# Patient Record
Sex: MALE | Race: WHITE | NOT HISPANIC OR LATINO | Employment: FULL TIME | URBAN - METROPOLITAN AREA
[De-identification: names, ages, dates, MRNs, and addresses within clinical notes are randomized per-mention and may not be internally consistent; named-entity substitution may affect disease eponyms.]

---

## 2018-04-16 ENCOUNTER — HISTORICAL (OUTPATIENT)
Dept: RADIOLOGY | Facility: HOSPITAL | Age: 63
End: 2018-04-16

## 2018-09-04 ENCOUNTER — HISTORICAL (OUTPATIENT)
Dept: PREADMISSION TESTING | Facility: HOSPITAL | Age: 63
End: 2018-09-04

## 2018-09-04 LAB
ABS NEUT (OLG): 2.62 X10(3)/MCL (ref 2.1–9.2)
ALBUMIN SERPL-MCNC: 3.9 GM/DL (ref 3.4–5)
ALBUMIN/GLOB SERPL: 1.3 RATIO (ref 1.1–2)
ALP SERPL-CCNC: 84 UNIT/L (ref 50–136)
ALT SERPL-CCNC: 47 UNIT/L (ref 12–78)
APTT PPP: 27.8 SECOND(S) (ref 24.8–36.9)
AST SERPL-CCNC: 23 UNIT/L (ref 15–37)
BASOPHILS # BLD AUTO: 0 X10(3)/MCL (ref 0–0.2)
BASOPHILS NFR BLD AUTO: 1 %
BILIRUB SERPL-MCNC: 0.6 MG/DL (ref 0.2–1)
BILIRUBIN DIRECT+TOT PNL SERPL-MCNC: 0.2 MG/DL (ref 0–0.5)
BILIRUBIN DIRECT+TOT PNL SERPL-MCNC: 0.4 MG/DL (ref 0–0.8)
BUN SERPL-MCNC: 16 MG/DL (ref 7–18)
CALCIUM SERPL-MCNC: 9.3 MG/DL (ref 8.5–10.1)
CHLORIDE SERPL-SCNC: 106 MMOL/L (ref 98–107)
CO2 SERPL-SCNC: 28 MMOL/L (ref 21–32)
CREAT SERPL-MCNC: 1.23 MG/DL (ref 0.7–1.3)
EOSINOPHIL # BLD AUTO: 0.2 X10(3)/MCL (ref 0–0.9)
EOSINOPHIL NFR BLD AUTO: 3 %
ERYTHROCYTE [DISTWIDTH] IN BLOOD BY AUTOMATED COUNT: 12.9 % (ref 11.5–17)
GLOBULIN SER-MCNC: 3.1 GM/DL (ref 2.4–3.5)
GLUCOSE SERPL-MCNC: 95 MG/DL (ref 74–106)
HCT VFR BLD AUTO: 46.4 % (ref 42–52)
HGB BLD-MCNC: 15.7 GM/DL (ref 14–18)
INR PPP: 0.92 (ref 0–1.27)
LYMPHOCYTES # BLD AUTO: 1.6 X10(3)/MCL (ref 0.6–4.6)
LYMPHOCYTES NFR BLD AUTO: 32 %
MCH RBC QN AUTO: 30.3 PG (ref 27–31)
MCHC RBC AUTO-ENTMCNC: 33.8 GM/DL (ref 33–36)
MCV RBC AUTO: 89.6 FL (ref 80–94)
MONOCYTES # BLD AUTO: 0.5 X10(3)/MCL (ref 0.1–1.3)
MONOCYTES NFR BLD AUTO: 11 %
NEUTROPHILS # BLD AUTO: 2.62 X10(3)/MCL (ref 1.4–7.9)
NEUTROPHILS NFR BLD AUTO: 53 %
PLATELET # BLD AUTO: 217 X10(3)/MCL (ref 130–400)
PMV BLD AUTO: 9.7 FL (ref 9.4–12.4)
POTASSIUM SERPL-SCNC: 4.7 MMOL/L (ref 3.5–5.1)
PROT SERPL-MCNC: 7 GM/DL (ref 6.4–8.2)
PROTHROMBIN TIME: 12.6 SECOND(S) (ref 12.2–14.7)
RBC # BLD AUTO: 5.18 X10(6)/MCL (ref 4.7–6.1)
SODIUM SERPL-SCNC: 141 MMOL/L (ref 136–145)
WBC # SPEC AUTO: 5 X10(3)/MCL (ref 4.5–11.5)

## 2018-09-10 ENCOUNTER — HISTORICAL (OUTPATIENT)
Dept: SURGERY | Facility: HOSPITAL | Age: 63
End: 2018-09-10

## 2018-09-11 LAB — CALCIUM SERPL-MCNC: 8.6 MG/DL (ref 8.5–10.1)

## 2018-10-08 ENCOUNTER — HISTORICAL (OUTPATIENT)
Dept: SURGERY | Facility: HOSPITAL | Age: 63
End: 2018-10-08

## 2018-10-09 LAB — CALCIUM SERPL-MCNC: 8.7 MG/DL (ref 8.5–10.1)

## 2019-01-28 ENCOUNTER — HISTORICAL (OUTPATIENT)
Dept: LAB | Facility: HOSPITAL | Age: 64
End: 2019-01-28

## 2019-01-28 LAB
ABS NEUT (OLG): 2.67 X10(3)/MCL (ref 2.1–9.2)
ALBUMIN SERPL-MCNC: 4.1 GM/DL (ref 3.4–5)
ALBUMIN/GLOB SERPL: 1.3 {RATIO}
ALP SERPL-CCNC: 123 UNIT/L (ref 50–136)
ALT SERPL-CCNC: 100 UNIT/L (ref 12–78)
AST SERPL-CCNC: 58 UNIT/L (ref 15–37)
BASOPHILS # BLD AUTO: 0 X10(3)/MCL (ref 0–0.2)
BASOPHILS NFR BLD AUTO: 1 %
BILIRUB SERPL-MCNC: 0.6 MG/DL (ref 0.2–1)
BILIRUBIN DIRECT+TOT PNL SERPL-MCNC: 0.2 MG/DL (ref 0–0.2)
BILIRUBIN DIRECT+TOT PNL SERPL-MCNC: 0.4 MG/DL (ref 0–0.8)
BUN SERPL-MCNC: 18 MG/DL (ref 7–18)
CALCIUM SERPL-MCNC: 9.6 MG/DL (ref 8.5–10.1)
CHLORIDE SERPL-SCNC: 107 MMOL/L (ref 98–107)
CHOLEST SERPL-MCNC: 224 MG/DL (ref 0–200)
CHOLEST/HDLC SERPL: 4.5 {RATIO} (ref 0–5)
CO2 SERPL-SCNC: 29 MMOL/L (ref 21–32)
CREAT SERPL-MCNC: 1.23 MG/DL (ref 0.7–1.3)
EOSINOPHIL # BLD AUTO: 0.1 X10(3)/MCL (ref 0–0.9)
EOSINOPHIL NFR BLD AUTO: 3 %
ERYTHROCYTE [DISTWIDTH] IN BLOOD BY AUTOMATED COUNT: 13 % (ref 11.5–17)
EST. AVERAGE GLUCOSE BLD GHB EST-MCNC: 105 MG/DL
GLOBULIN SER-MCNC: 3.1 GM/DL (ref 2.4–3.5)
GLUCOSE SERPL-MCNC: 101 MG/DL (ref 74–106)
HBA1C MFR BLD: 5.3 % (ref 4.2–6.3)
HCT VFR BLD AUTO: 47 % (ref 42–52)
HDLC SERPL-MCNC: 50 MG/DL (ref 35–60)
HGB BLD-MCNC: 16 GM/DL (ref 14–18)
LDLC SERPL CALC-MCNC: 150 MG/DL (ref 0–129)
LYMPHOCYTES # BLD AUTO: 1.5 X10(3)/MCL (ref 0.6–4.6)
LYMPHOCYTES NFR BLD AUTO: 31 %
MCH RBC QN AUTO: 29.9 PG (ref 27–31)
MCHC RBC AUTO-ENTMCNC: 34 GM/DL (ref 33–36)
MCV RBC AUTO: 87.9 FL (ref 80–94)
MONOCYTES # BLD AUTO: 0.5 X10(3)/MCL (ref 0.1–1.3)
MONOCYTES NFR BLD AUTO: 11 %
NEUTROPHILS # BLD AUTO: 2.67 X10(3)/MCL (ref 2.1–9.2)
NEUTROPHILS NFR BLD AUTO: 55 %
PLATELET # BLD AUTO: 245 X10(3)/MCL (ref 130–400)
PMV BLD AUTO: 9.7 FL (ref 9.4–12.4)
POTASSIUM SERPL-SCNC: 4.7 MMOL/L (ref 3.5–5.1)
PROT SERPL-MCNC: 7.2 GM/DL (ref 6.4–8.2)
PSA SERPL-MCNC: 0.93 NG/ML (ref 0–4)
RBC # BLD AUTO: 5.35 X10(6)/MCL (ref 4.7–6.1)
SODIUM SERPL-SCNC: 142 MMOL/L (ref 136–145)
TRIGL SERPL-MCNC: 121 MG/DL (ref 30–150)
VLDLC SERPL CALC-MCNC: 24 MG/DL
WBC # SPEC AUTO: 4.9 X10(3)/MCL (ref 4.5–11.5)

## 2019-02-27 ENCOUNTER — HISTORICAL (OUTPATIENT)
Dept: RADIOLOGY | Facility: HOSPITAL | Age: 64
End: 2019-02-27

## 2019-02-27 ENCOUNTER — HISTORICAL (OUTPATIENT)
Dept: ADMINISTRATIVE | Facility: HOSPITAL | Age: 64
End: 2019-02-27

## 2019-02-27 LAB
ALBUMIN SERPL-MCNC: 3.7 GM/DL (ref 3.4–5)
ALBUMIN/GLOB SERPL: 1.4 {RATIO}
ALP SERPL-CCNC: 98 UNIT/L (ref 50–136)
ALT SERPL-CCNC: 71 UNIT/L (ref 12–78)
AST SERPL-CCNC: 33 UNIT/L (ref 15–37)
BILIRUB SERPL-MCNC: 0.6 MG/DL (ref 0.2–1)
BILIRUBIN DIRECT+TOT PNL SERPL-MCNC: 0.1 MG/DL (ref 0–0.2)
BILIRUBIN DIRECT+TOT PNL SERPL-MCNC: 0.5 MG/DL (ref 0–0.8)
BUN SERPL-MCNC: 15 MG/DL (ref 7–18)
CALCIUM SERPL-MCNC: 9 MG/DL (ref 8.5–10.1)
CHLORIDE SERPL-SCNC: 107 MMOL/L (ref 98–107)
CO2 SERPL-SCNC: 29 MMOL/L (ref 21–32)
CREAT SERPL-MCNC: 1.17 MG/DL (ref 0.7–1.3)
GLOBULIN SER-MCNC: 2.7 GM/DL (ref 2.4–3.5)
GLUCOSE SERPL-MCNC: 101 MG/DL (ref 74–106)
POTASSIUM SERPL-SCNC: 4.7 MMOL/L (ref 3.5–5.1)
PROT SERPL-MCNC: 6.4 GM/DL (ref 6.4–8.2)
SODIUM SERPL-SCNC: 142 MMOL/L (ref 136–145)

## 2020-03-09 LAB — CRC RECOMMENDATION EXT: NORMAL

## 2021-02-05 ENCOUNTER — HISTORICAL (OUTPATIENT)
Dept: ADMINISTRATIVE | Facility: HOSPITAL | Age: 66
End: 2021-02-05

## 2021-02-05 LAB
ABS NEUT (OLG): 2.78 X10(3)/MCL (ref 2.1–9.2)
ALBUMIN SERPL-MCNC: 4 GM/DL (ref 3.4–4.8)
ALBUMIN/GLOB SERPL: 1.9 RATIO (ref 1.1–2)
ALP SERPL-CCNC: 82 UNIT/L (ref 40–150)
ALT SERPL-CCNC: 30 UNIT/L (ref 0–55)
APPEARANCE, UA: CLEAR
AST SERPL-CCNC: 22 UNIT/L (ref 5–34)
BACTERIA SPEC CULT: ABNORMAL /HPF
BASOPHILS # BLD AUTO: 0 X10(3)/MCL (ref 0–0.2)
BASOPHILS NFR BLD AUTO: 1 %
BILIRUB SERPL-MCNC: 0.7 MG/DL
BILIRUB UR QL STRIP: NEGATIVE
BILIRUBIN DIRECT+TOT PNL SERPL-MCNC: 0.3 MG/DL (ref 0–0.5)
BILIRUBIN DIRECT+TOT PNL SERPL-MCNC: 0.4 MG/DL (ref 0–0.8)
BUN SERPL-MCNC: 15.2 MG/DL (ref 8.4–25.7)
CALCIUM SERPL-MCNC: 8.8 MG/DL (ref 8.8–10)
CHLORIDE SERPL-SCNC: 106 MMOL/L (ref 98–107)
CHOLEST SERPL-MCNC: 152 MG/DL
CHOLEST/HDLC SERPL: 4 {RATIO} (ref 0–5)
CO2 SERPL-SCNC: 26 MMOL/L (ref 23–31)
COLOR UR: YELLOW
CREAT SERPL-MCNC: 1.08 MG/DL (ref 0.73–1.18)
EOSINOPHIL # BLD AUTO: 0.1 X10(3)/MCL (ref 0–0.9)
EOSINOPHIL NFR BLD AUTO: 2 %
ERYTHROCYTE [DISTWIDTH] IN BLOOD BY AUTOMATED COUNT: 14 % (ref 11.5–17)
EST. AVERAGE GLUCOSE BLD GHB EST-MCNC: 105.4 MG/DL
GLOBULIN SER-MCNC: 2.1 GM/DL (ref 2.4–3.5)
GLUCOSE (UA): NEGATIVE
GLUCOSE SERPL-MCNC: 95 MG/DL (ref 82–115)
HBA1C MFR BLD: 5.3 %
HCT VFR BLD AUTO: 41.3 % (ref 42–52)
HDLC SERPL-MCNC: 41 MG/DL (ref 35–60)
HGB BLD-MCNC: 14.1 GM/DL (ref 14–18)
HGB UR QL STRIP: NEGATIVE
KETONES UR QL STRIP: ABNORMAL
LDLC SERPL CALC-MCNC: 83 MG/DL (ref 50–140)
LEUKOCYTE ESTERASE UR QL STRIP: ABNORMAL
LYMPHOCYTES # BLD AUTO: 1.4 X10(3)/MCL (ref 0.6–4.6)
LYMPHOCYTES NFR BLD AUTO: 29 %
MCH RBC QN AUTO: 30.3 PG (ref 27–31)
MCHC RBC AUTO-ENTMCNC: 34.1 GM/DL (ref 33–36)
MCV RBC AUTO: 88.8 FL (ref 80–94)
MONOCYTES # BLD AUTO: 0.6 X10(3)/MCL (ref 0.1–1.3)
MONOCYTES NFR BLD AUTO: 12 %
NEUTROPHILS # BLD AUTO: 2.78 X10(3)/MCL (ref 2.1–9.2)
NEUTROPHILS NFR BLD AUTO: 56 %
NITRITE UR QL STRIP: NEGATIVE
PH UR STRIP: 6 [PH] (ref 5–9)
PLATELET # BLD AUTO: 207 X10(3)/MCL (ref 130–400)
PMV BLD AUTO: 9.7 FL (ref 9.4–12.4)
POTASSIUM SERPL-SCNC: 4.4 MMOL/L (ref 3.5–5.1)
PROT SERPL-MCNC: 6.1 GM/DL (ref 5.8–7.6)
PROT UR QL STRIP: NEGATIVE
PSA SERPL-MCNC: 0.87 NG/ML
RBC # BLD AUTO: 4.65 X10(6)/MCL (ref 4.7–6.1)
RBC #/AREA URNS HPF: ABNORMAL /[HPF]
SODIUM SERPL-SCNC: 141 MMOL/L (ref 136–145)
SP GR UR STRIP: 1.02 (ref 1–1.03)
SQUAMOUS EPITHELIAL, UA: ABNORMAL
TRIGL SERPL-MCNC: 139 MG/DL (ref 34–140)
TSH SERPL-ACNC: 0.37 UIU/ML (ref 0.35–4.94)
UROBILINOGEN UR STRIP-ACNC: 1
VLDLC SERPL CALC-MCNC: 28 MG/DL
WBC # SPEC AUTO: 5 X10(3)/MCL (ref 4.5–11.5)
WBC #/AREA URNS HPF: 6 /HPF (ref 0–3)

## 2022-02-11 ENCOUNTER — HISTORICAL (OUTPATIENT)
Dept: ADMINISTRATIVE | Facility: HOSPITAL | Age: 67
End: 2022-02-11

## 2022-02-11 LAB
ABS NEUT (OLG): 2.78 (ref 2.1–9.2)
ALBUMIN SERPL-MCNC: 4 G/DL (ref 3.4–4.8)
ALBUMIN/GLOB SERPL: 1.2 {RATIO} (ref 1.1–2)
ALP SERPL-CCNC: 113 U/L (ref 40–150)
ALT SERPL-CCNC: 59 U/L (ref 0–55)
APPEARANCE, UA: CLEAR
AST SERPL-CCNC: 23 U/L (ref 5–34)
BACTERIA SPEC CULT: NORMAL
BASOPHILS # BLD AUTO: 0 10*3/UL (ref 0–0.2)
BASOPHILS NFR BLD AUTO: 1 %
BILIRUB SERPL-MCNC: 0.6 MG/DL
BILIRUB UR QL STRIP: NEGATIVE
BILIRUBIN DIRECT+TOT PNL SERPL-MCNC: 0.2 (ref 0–0.5)
BILIRUBIN DIRECT+TOT PNL SERPL-MCNC: 0.4 (ref 0–0.8)
BUDDING YEAST: NORMAL
BUN SERPL-MCNC: 15.7 MG/DL (ref 8.4–25.7)
CALCIUM SERPL-MCNC: 10.2 MG/DL (ref 8.7–10.5)
CASTS, UA: NORMAL
CHLORIDE SERPL-SCNC: 106 MMOL/L (ref 98–107)
CHOLEST SERPL-MCNC: 220 MG/DL
CHOLEST/HDLC SERPL: 5 {RATIO} (ref 0–5)
CO2 SERPL-SCNC: 29 MMOL/L (ref 23–31)
COLOR UR: YELLOW
CREAT SERPL-MCNC: 1.17 MG/DL (ref 0.73–1.18)
CRYSTALS: NORMAL
EOSINOPHIL # BLD AUTO: 0.1 10*3/UL (ref 0–0.9)
EOSINOPHIL NFR BLD AUTO: 3 %
ERYTHROCYTE [DISTWIDTH] IN BLOOD BY AUTOMATED COUNT: 12.8 % (ref 11.5–17)
GLOBULIN SER-MCNC: 3.2 G/DL (ref 2.4–3.5)
GLUCOSE (UA): NEGATIVE
GLUCOSE SERPL-MCNC: 100 MG/DL (ref 82–115)
HCT VFR BLD AUTO: 45.4 % (ref 42–52)
HDLC SERPL-MCNC: 45 MG/DL (ref 35–60)
HEMOLYSIS INTERF INDEX SERPL-ACNC: 0
HGB BLD-MCNC: 15.2 G/DL (ref 14–18)
HGB UR QL STRIP: NEGATIVE
ICTERIC INTERF INDEX SERPL-ACNC: 1
KETONES UR QL STRIP: NEGATIVE
LDLC SERPL CALC-MCNC: 143 MG/DL (ref 50–140)
LEUKOCYTE ESTERASE UR QL STRIP: NEGATIVE
LIPEMIC INTERF INDEX SERPL-ACNC: 8
LYMPHOCYTES # BLD AUTO: 1.5 10*3/UL (ref 0.6–4.6)
LYMPHOCYTES NFR BLD AUTO: 30 %
MANUAL DIFF? (OHS): NO
MCH RBC QN AUTO: 29.8 PG (ref 27–31)
MCHC RBC AUTO-ENTMCNC: 33.5 G/DL (ref 33–36)
MCV RBC AUTO: 89 FL (ref 80–94)
MONOCYTES # BLD AUTO: 0.5 10*3/UL (ref 0.1–1.3)
MONOCYTES NFR BLD AUTO: 10 %
NEUTROPHILS # BLD AUTO: 2.78 10*3/UL (ref 2.1–9.2)
NEUTROPHILS NFR BLD AUTO: 56 %
NITRITE UR QL STRIP: NEGATIVE
PH UR STRIP: 6 [PH] (ref 5–9)
PLATELET # BLD AUTO: 232 10*3/UL (ref 130–400)
PMV BLD AUTO: 9.9 FL (ref 9.4–12.4)
POTASSIUM SERPL-SCNC: 4.2 MMOL/L (ref 3.5–5.1)
PROT SERPL-MCNC: 7.2 G/DL (ref 5.8–7.6)
PROT UR QL STRIP: NEGATIVE
PSA SERPL-MCNC: 1.09 NG/ML
RBC # BLD AUTO: 5.1 10*6/UL (ref 4.7–6.1)
RBC #/AREA URNS HPF: NORMAL /[HPF] (ref 0–2)
SMALL ROUND CELLS, UA: NORMAL
SODIUM SERPL-SCNC: 144 MMOL/L (ref 136–145)
SP GR UR STRIP: 1 (ref 1–1.03)
SPERM URNS QL MICRO: NORMAL
SQUAMOUS EPITHELIAL, UA: NORMAL (ref 0–4)
T4 FREE SERPL-MCNC: 1.1 NG/DL (ref 0.7–1.48)
TRIGL SERPL-MCNC: 162 MG/DL (ref 34–140)
TSH SERPL-ACNC: 0.16 M[IU]/L (ref 0.35–4.94)
UROBILINOGEN UR STRIP-ACNC: 0.2
VLDLC SERPL CALC-MCNC: 32 MG/DL
WBC # SPEC AUTO: 4.9 10*3/UL (ref 4.5–11.5)
WBC #/AREA URNS HPF: NORMAL /[HPF] (ref 0–2)

## 2022-04-10 ENCOUNTER — HISTORICAL (OUTPATIENT)
Dept: ADMINISTRATIVE | Facility: HOSPITAL | Age: 67
End: 2022-04-10
Payer: COMMERCIAL

## 2022-04-26 VITALS
HEIGHT: 69 IN | OXYGEN SATURATION: 99 % | WEIGHT: 205 LBS | BODY MASS INDEX: 30.36 KG/M2 | SYSTOLIC BLOOD PRESSURE: 128 MMHG | DIASTOLIC BLOOD PRESSURE: 74 MMHG

## 2022-04-30 NOTE — OP NOTE
DATE OF SURGERY:        SURGEON:  Richie Santana MD    PREOPERATIVE DIAGNOSIS:  Follicular carcinoma.    POSTOPERATIVE DIAGNOSIS:  Follicular carcinoma.    PROCEDURE:  Left completion thyroidectomy.    ANESTHESIA:  General.    BLOOD LOSS:  Less than 10 cc.    SPECIMENS:  Left thyroid lobe.    CONDITION:  Stable.    HISTORY OF PRESENT ILLNESS:  This is a 62-year-old gentleman who previously underwent right thyroid lobectomy and isthmusectomy by me.  Final pathology came back as a follicular carcinoma.  In light of this, he was offered aforementioned surgery.  All risks and benefits were explained to the patient and informed consent was obtained prior to proceeding.    REPORT OF OPERATION:  The patient was taken to the operating room and placed on the operating table in supine position and general endotracheal anesthesia was administered.  I went through my previous anterior neck incision after draping in sterile fashion.  I dissected down and identified the strap muscles.  I removed the 3-0 Vicryl suture that were placed there and dissected the strap muscle in the midline.  Identified the trachea and larynx.  Identified the left thyroid lobe, dissected the tissue off it.  Dissected out the superior pole.  Clamped and ligated the superior pole vessels with the Harmonic scalpel.  Then identified the superior parathyroid gland and dissected this off the gland.  I was careful to preserve its native vasculature.  I dissected down to the level of the cricothyroid joint and identified the recurrent laryngeal nerve.  This was dissected out superiorly and inferiorly until it was well skeletonized. I continued my dissection inferiorly.  Clamped and ligated the middle thyroid vein __________ the inferior pole vessels.  Stayed on the gland. Dissected down to the level of the trachea.  At this point, the gland__________ anterior tracheal wall and the Bovie was used to remove the remaining attachments.  The lobe was then  sent for permanent pathology.  It was copiously irrigated with saline irrigation.  I explored the level 6 on each side for signs of suspicious lymph nodes.  None were noted.  I completed hemostasis using bipolar.  A small piece of Surgicel packing of the joint.  The wound was then closed using 3-0 Vicryl in simple running fashion and then closed the skin using 3-0 Vicryl deep and running 5-0 Monocryl     subcuticular stitch on the skin.  The patient was awoken and brought to the recovery room without complications.        ______________________________  Richie Santana MD JD/BAKARI  DD:  10/30/2018  Time:  04:06PM  DT:  10/30/2018  Time:  05:48PM  Job #:  [id number]

## 2022-04-30 NOTE — OP NOTE
DATE OF SURGERY:        SURGEON:  Richie Santana MD    PREOPERATIVE DIAGNOSIS:  Right thyroid mass.    POSTOP DIAGNOSIS:  Right thyroid mass.    PROCEDURE:  Right thyroid lobectomy and isthmusectomy.    ASSISTANT:  None.    ANESTHESIA:  General endotracheal.    BLOOD LOSS:  10 cc.    SPECIMENS:  Right thyroid lobe and isthmus.    CONDITION:  stable.    COMPLICATIONS:  None.    HISTORY OF PRESENT ILLNESS:  This is a 62-year-old male referred to me by Dr. Julian Wright after a suspicious ultrasound-guided biopsy and suspicious Afirma testing.  In light of this, he was offered aforementioned surgery.  Risks and benefits were explained to the patient in detail.  Informed consent was obtained prior to proceeding.    REPORT OF OPERATION:  The patient was brought to the operative room, placed on the operating table in supine position where general endotracheal was administered.  I marked out my preplanned incision in the anterior neck crease, injected this with 3 cc 1% lidocaine with epinephrine.  Patient prepped and draped in a sterile fashion.  __________ blade was used to make an approximately 4 cm incision in the anterior neck.  I dissected down, identified the platysma.  I raised circumferential subplatysmal flaps with the Bovie.  I then placed the John retractor into position in standard fashion, identified the strap muscles, divided these in the midline raphae using the Bovie, and identified the thyroid gland.  I dissected out the superior pole, clamped and ligated the superior pole vessels using the harmonic scalpel.  I then dissected down the posterior aspect of the gland, identified the superior parathyroid gland, dissected this off, carefully delivered attached to its native blood supply.  I then dissected down to the level of the cricothyroid joint, identified the recurrent laryngeal nerve.  I stimulated to confirm that it was in fact the nerve (which it was) and dissected it inferiorly.  At this  point, I identified the middle thyroid vein, clamped and ligated this with the Harmonic scalpel __________ dissection inferiorly.  When I clamped and ligated the inferior pole vessels, I did not visualize the inferior parathyroid gland.  I stayed on the capsule of the gland to ensure that it stayed down.  At this point, I began the dissection of the nerve at the joint.  Note the cystic lesion within the gland was just on top of the nerve which made the dissection somewhat difficult, but I was able to dissect the Berry ligament to where the gland itself was pedicled on the anterior tracheal wall.  At this point, the Bovie was used to remove the remainder of the gland.  This was sent for frozen section.  While this was being examined, I performed a level VI examination.  Found no suspicious lymph nodes or lesions within the level VI.  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  The pathologist could not make a definitive call.  In light of this, the wound was closed after irrigating and achieving complete hemostasis.  __________ 3-0 Vicryl to reapproximate the strap muscles as well as a 3-0 Vicryl in buried deep fashion to close the platysmal layer.  The skin was closed using a 5-0 Monocryl in running subcuticular fashion followed by Steri-Strips and pressure dressing.  The patient was then awoken and brought to the recovery room without complications.        ______________________________  Richie Santana MD JD/  DD:  09/10/2018  Time:  10:29AM  DT:  09/10/2018  Time:  01:11PM  Job #:  [id number]

## 2022-11-08 ENCOUNTER — DOCUMENTATION ONLY (OUTPATIENT)
Dept: ADMINISTRATIVE | Facility: HOSPITAL | Age: 67
End: 2022-11-08
Payer: COMMERCIAL

## 2023-02-06 DIAGNOSIS — Z00.00 WELL ADULT EXAM: Primary | ICD-10-CM

## 2023-02-06 DIAGNOSIS — E78.5 HYPERLIPIDEMIA, UNSPECIFIED HYPERLIPIDEMIA TYPE: ICD-10-CM

## 2023-02-06 DIAGNOSIS — I10 HYPERTENSION, UNSPECIFIED TYPE: ICD-10-CM

## 2023-02-06 DIAGNOSIS — E03.9 HYPOTHYROIDISM, UNSPECIFIED TYPE: ICD-10-CM

## 2023-12-18 PROBLEM — I10 PRIMARY HYPERTENSION: Status: ACTIVE | Noted: 2023-12-18

## 2023-12-20 ENCOUNTER — OFFICE VISIT (OUTPATIENT)
Dept: SURGERY | Facility: CLINIC | Age: 68
End: 2023-12-20
Payer: COMMERCIAL

## 2023-12-20 VITALS
HEIGHT: 69 IN | HEART RATE: 76 BPM | WEIGHT: 211.38 LBS | SYSTOLIC BLOOD PRESSURE: 137 MMHG | BODY MASS INDEX: 31.31 KG/M2 | DIASTOLIC BLOOD PRESSURE: 80 MMHG

## 2023-12-20 DIAGNOSIS — K40.90 LEFT INGUINAL HERNIA: Primary | ICD-10-CM

## 2023-12-20 DIAGNOSIS — K40.90 LEFT INGUINAL HERNIA: ICD-10-CM

## 2023-12-20 PROCEDURE — 99204 OFFICE O/P NEW MOD 45 MIN: CPT | Mod: ,,, | Performed by: SURGERY

## 2023-12-20 RX ORDER — CEFAZOLIN SODIUM 2 G/50ML
2 SOLUTION INTRAVENOUS
Status: CANCELLED | OUTPATIENT
Start: 2023-12-20

## 2024-01-02 RX ORDER — UBIDECARENONE 30 MG
30 CAPSULE ORAL DAILY
COMMUNITY

## 2024-01-04 NOTE — DISCHARGE INSTRUCTIONS
Post Operative Inguinal Hernia Discharge Instructions      Laparoscopic Inguinal Hernia Repair, Adult, Care After  This sheet gives you information about how to care for yourself after your procedure. Your health care provider may also give you more specific instructions. If you have problems or questions, contact your health care provider.  What can I expect after the procedure?  After the procedure, it is common to have:   Pain.   Swelling and bruising around the incision area.   Scrotal swelling, in men.   Some fluid or blood draining from your incisions.  Follow these instructions at home:  Incision care   Follow instructions from your health care provider about how to take care of your incisions. Make sure you:  ? Wash your hands with soap and water before you change your bandage (dressing). If soap and water are not available, use hand .  ? Keep surgical gauze dressing on for 48 hours, then ok to remove gauze dressings and shower.   Ok to shower. Wash incisions with antibacterial soap and water for 2 weeks post op. Pat dry. Avoid tub baths/swimming for 2 weeks post op to ensure incisions have completely closed.   ? Leave stitches (sutures), skin glue, or adhesive strips in place. Do not remove adhesive strips or dermabond. It will come off within 1-2 weeks.    Check your incision area every day for signs of infection. Check for:  ? More redness, swelling, or pain.  ? More fluid or blood.  ? Warmth.  ? Pus or a bad smell.   Wear loose, soft clothing while your incisions heal.   Wear scrotal support (jock strap) provided to you by the hospital staff to prevent post op swelling. If scrotal support not available, please wear tight fitted briefs to limit scrotal swelling.   Scrotal support helps to limit swelling in the groin and scrotum in the initial post op period. Wear scrotal support for first 2 weeks post op if possible.   Driving   Do not drive or use heavy machinery while taking prescription pain  medicine.   Do not drive for 3 days after surgery or as long as you are taking prescription pain medicine.    Activity   Do not lift anything that is heavier than 20 lb (4.5 kg) for 4 weeks post op.    Ask your health care provider what activities are safe for you. A lot of activity during the first week after surgery can increase pain and swelling. For 1 week after your procedure:  ? Avoid activities that take a lot of effort, such as exercise or sports.  ? You may walk and climb stairs as needed for daily activity, but avoid long walks or climbing stairs for exercise.  Managing pain and swelling     Put ice on painful or swollen areas:  ? Put ice in a plastic bag.  ? Place a towel between your skin and the bag.  ? Leave the ice on for 20 minutes, 2-3 times a day.  General instructions   Take over-the-counter and prescription medicines only as told by your health care provider.   To prevent or treat constipation while you are taking prescription pain medicine, your health care provider may recommend that you:   ? Drink enough fluid to keep your urine pale yellow.  ? Take over-the-counter or prescription medicines.   ? Eat foods that are high in fiber, such as fresh fruits and vegetables, whole grains, and beans.   ? Limit foods that are high in fat and processed sugars, such as fried and sweet foods.   Do not use any products that contain nicotine or tobacco, such as cigarettes and e-cigarettes. If you need help quitting, ask your health care provider.   Drink enough fluid to keep your urine pale yellow.   Keep all follow-up visits as told by your health care provider. This is important.  Contact a health care provider if:   You have more redness, swelling, or pain around your incisions or your groin area.   You have more swelling in your scrotum.   You have more fluid or blood coming from your incisions.   Your incisions feel warm to the touch.   You have severe pain and medicines do not help.   You have abdominal  pain or swelling.   You cannot eat or drink without vomiting.   You cannot urinate or pass a bowel movement.   You faint.   You feel dizzy.   You have nausea and vomiting.   You have a fever.  Get help right away if:   You have pus or a bad smell coming from your incisions.   You have redness, warmth, or pain in your leg.   You have chest pain.   You have problems breathing.  Summary   Pain, swelling, and bruising are common after the procedure.   Check your incision area every day for signs of infection, such as more redness, swelling, or pain.   Put ice on painful or swollen areas for 20 minutes, 2-3 times a day.  This information is not intended to replace advice given to you by your health care provider. Make sure you discuss any questions you have with your health care provider.    How to Prevent Constipation After Surgery  Constipation is a common problem after surgery. Many things can make constipation more likely after a surgery, including:   Certain medicines, especially numbing medicines (anesthetics) and very strong pain medicines called opioids.   Feeling stressed because of the surgery.   Eating different foods than normal.   Being less active.  Symptoms of constipation include:   Having fewer than three bowel movements a week.   Straining to have a bowel movement.   Having hard, dry, or larger-than-normal stools (feces).   Discomfort in the lower abdomen, such as cramps or bloating.   Not feeling relief after having a bowel movement.   Nausea and vomiting.  You can take steps to help prevent constipation after surgery.  Follow these instructions at home:  Eating and drinking     Eat foods that have a lot of fiber in them, such as beans, bran, whole grains, and fresh fruits and vegetables.   Limit foods that are high in fat and processed sugars, such as fried or sweet foods. These include french fries, hamburgers, cookies, and candy.   Take a fiber supplement as told by your health care provider. If you  are not taking a fiber supplement and you think you are not getting enough fiber from foods, talk to your health care provider about adding a fiber supplement to your diet.   Drink enough fluid to keep your urine pale yellow.   Drink clear fluids, especially water. Avoid drinking alcohol, caffeine, and soda. These can make constipation worse.  Activity     After surgery, return to your normal activities slowly, or when your health care provider says it is okay.   Start walking as soon as you can. Try to go a little farther each day.   Once your health care provider approves, do some sort of regular exercise. This helps prevent constipation.  Bowel movements   Go to the restroom when you have the urge to go. Do not hold it in.   Try drinking something hot to get a bowel movement started.   Keep track of how often you use the restroom.  Medicines   Take over-the-counter and prescription medicines only as told by your health care provider.   Talk to your health care provider about medicines that may help prevent constipation, particularly if you have a history of constipation. Your health care provider may suggest a stool softener, laxative, or fiber supplement.   Do not take any medicines without talking to your health care provider first.  Contact a health care provider if:   You used stool softeners or laxatives and still have not had a bowel movement within 24-48 hours after using them.   You have not had a bowel movement in 3 days.   You have a fever.  Get help right away if you have:   Constipation that lasts for more than 4 days or if your symptoms get worse.   Bright red blood in your stool.   Pain in the abdomen or rectum.   Very bad cramping.   Thin, pencil-like stools.   Unexplained weight loss.  Summary   Constipation is a common problem after surgery. Many things can make constipation more likely after a surgery, including certain medicines, eating different foods than normal, and being less  active.   Symptoms of constipation include having fewer than three bowel movements a week, straining to have a bowel movement, and cramps or bloating in the lower abdomen.   To help prevent constipation, you should eat foods that are high in fiber, drink plenty of fluids, and get regular physical activity.   Your health care provider may suggest medicines, such as stool softeners or laxatives, to help prevent constipation.  This information is not intended to replace advice given to you by your health care provider. Make sure you discuss any questions you have with your health care provider.

## 2024-01-08 PROCEDURE — 85730 THROMBOPLASTIN TIME PARTIAL: CPT | Performed by: FAMILY MEDICINE

## 2024-01-08 PROCEDURE — 85610 PROTHROMBIN TIME: CPT | Performed by: FAMILY MEDICINE

## 2024-01-11 ENCOUNTER — ANESTHESIA EVENT (OUTPATIENT)
Dept: SURGERY | Facility: HOSPITAL | Age: 69
End: 2024-01-11
Payer: COMMERCIAL

## 2024-01-11 ENCOUNTER — ANESTHESIA (OUTPATIENT)
Dept: SURGERY | Facility: HOSPITAL | Age: 69
End: 2024-01-11
Payer: COMMERCIAL

## 2024-01-11 ENCOUNTER — HOSPITAL ENCOUNTER (OUTPATIENT)
Facility: HOSPITAL | Age: 69
Discharge: HOME | End: 2024-01-11
Attending: SURGERY | Admitting: SURGERY
Payer: COMMERCIAL

## 2024-01-11 DIAGNOSIS — K40.90 INGUINAL HERNIA OF LEFT SIDE WITHOUT OBSTRUCTION OR GANGRENE: Primary | ICD-10-CM

## 2024-01-11 PROCEDURE — 63600175 PHARM REV CODE 636 W HCPCS

## 2024-01-11 PROCEDURE — 27201423 OPTIME MED/SURG SUP & DEVICES STERILE SUPPLY: Performed by: SURGERY

## 2024-01-11 PROCEDURE — 36000709 HC OR TIME LEV III EA ADD 15 MIN: Performed by: SURGERY

## 2024-01-11 PROCEDURE — 71000033 HC RECOVERY, INTIAL HOUR: Performed by: SURGERY

## 2024-01-11 PROCEDURE — 25000003 PHARM REV CODE 250: Performed by: ANESTHESIOLOGY

## 2024-01-11 PROCEDURE — 37000009 HC ANESTHESIA EA ADD 15 MINS: Performed by: SURGERY

## 2024-01-11 PROCEDURE — 63600175 PHARM REV CODE 636 W HCPCS: Performed by: SURGERY

## 2024-01-11 PROCEDURE — 37000008 HC ANESTHESIA 1ST 15 MINUTES: Performed by: SURGERY

## 2024-01-11 PROCEDURE — 25000003 PHARM REV CODE 250

## 2024-01-11 PROCEDURE — 71000016 HC POSTOP RECOV ADDL HR: Performed by: SURGERY

## 2024-01-11 PROCEDURE — C1781 MESH (IMPLANTABLE): HCPCS | Performed by: SURGERY

## 2024-01-11 PROCEDURE — 71000015 HC POSTOP RECOV 1ST HR: Performed by: SURGERY

## 2024-01-11 PROCEDURE — 36000708 HC OR TIME LEV III 1ST 15 MIN: Performed by: SURGERY

## 2024-01-11 PROCEDURE — D9220A PRA ANESTHESIA: Mod: ANES,,, | Performed by: ANESTHESIOLOGY

## 2024-01-11 PROCEDURE — 49651 LAP ING HERNIA REPAIR RECUR: CPT | Mod: AS,LT,,

## 2024-01-11 PROCEDURE — 25000003 PHARM REV CODE 250: Performed by: NURSE ANESTHETIST, CERTIFIED REGISTERED

## 2024-01-11 PROCEDURE — D9220A PRA ANESTHESIA: Mod: CRNA,,, | Performed by: NURSE ANESTHETIST, CERTIFIED REGISTERED

## 2024-01-11 PROCEDURE — 49651 LAP ING HERNIA REPAIR RECUR: CPT | Mod: LT,,, | Performed by: SURGERY

## 2024-01-11 PROCEDURE — 63600175 PHARM REV CODE 636 W HCPCS: Performed by: ANESTHESIOLOGY

## 2024-01-11 DEVICE — IMPLANTABLE DEVICE: Type: IMPLANTABLE DEVICE | Site: ABDOMEN | Status: FUNCTIONAL

## 2024-01-11 RX ORDER — CEFAZOLIN SODIUM 1 G/3ML
INJECTION, POWDER, FOR SOLUTION INTRAMUSCULAR; INTRAVENOUS
Status: DISCONTINUED | OUTPATIENT
Start: 2024-01-11 | End: 2024-01-11 | Stop reason: HOSPADM

## 2024-01-11 RX ORDER — LIDOCAINE HYDROCHLORIDE 10 MG/ML
INJECTION, SOLUTION EPIDURAL; INFILTRATION; INTRACAUDAL; PERINEURAL
Status: DISCONTINUED | OUTPATIENT
Start: 2024-01-11 | End: 2024-01-11

## 2024-01-11 RX ORDER — HYDROCODONE BITARTRATE AND ACETAMINOPHEN 7.5; 325 MG/1; MG/1
1 TABLET ORAL EVERY 6 HOURS PRN
Status: DISCONTINUED | OUTPATIENT
Start: 2024-01-11 | End: 2024-01-11 | Stop reason: HOSPADM

## 2024-01-11 RX ORDER — LIDOCAINE HYDROCHLORIDE 10 MG/ML
1 INJECTION, SOLUTION EPIDURAL; INFILTRATION; INTRACAUDAL; PERINEURAL ONCE
Status: DISCONTINUED | OUTPATIENT
Start: 2024-01-11 | End: 2024-01-11 | Stop reason: HOSPADM

## 2024-01-11 RX ORDER — DIPHENHYDRAMINE HYDROCHLORIDE 50 MG/ML
25 INJECTION INTRAMUSCULAR; INTRAVENOUS EVERY 6 HOURS PRN
Status: DISCONTINUED | OUTPATIENT
Start: 2024-01-11 | End: 2024-01-11 | Stop reason: HOSPADM

## 2024-01-11 RX ORDER — MEPERIDINE HYDROCHLORIDE 25 MG/ML
50 INJECTION INTRAMUSCULAR; INTRAVENOUS; SUBCUTANEOUS
Status: DISCONTINUED | OUTPATIENT
Start: 2024-01-11 | End: 2024-01-11 | Stop reason: HOSPADM

## 2024-01-11 RX ORDER — GABAPENTIN 300 MG/1
300 CAPSULE ORAL
Status: COMPLETED | OUTPATIENT
Start: 2024-01-11 | End: 2024-01-11

## 2024-01-11 RX ORDER — METOCLOPRAMIDE HYDROCHLORIDE 5 MG/ML
10 INJECTION INTRAMUSCULAR; INTRAVENOUS EVERY 10 MIN PRN
Status: DISCONTINUED | OUTPATIENT
Start: 2024-01-11 | End: 2024-01-11 | Stop reason: HOSPADM

## 2024-01-11 RX ORDER — ACETAMINOPHEN 500 MG
1000 TABLET ORAL
Status: COMPLETED | OUTPATIENT
Start: 2024-01-11 | End: 2024-01-11

## 2024-01-11 RX ORDER — ONDANSETRON 4 MG/1
4 TABLET, ORALLY DISINTEGRATING ORAL ONCE
Status: COMPLETED | OUTPATIENT
Start: 2024-01-11 | End: 2024-01-11

## 2024-01-11 RX ORDER — PROPOFOL 10 MG/ML
VIAL (ML) INTRAVENOUS
Status: DISCONTINUED | OUTPATIENT
Start: 2024-01-11 | End: 2024-01-11

## 2024-01-11 RX ORDER — SEVOFLURANE 250 ML/250ML
LIQUID RESPIRATORY (INHALATION)
Status: DISCONTINUED
Start: 2024-01-11 | End: 2024-01-11 | Stop reason: HOSPADM

## 2024-01-11 RX ORDER — MIDAZOLAM HYDROCHLORIDE 1 MG/ML
2 INJECTION INTRAMUSCULAR; INTRAVENOUS ONCE AS NEEDED
Status: COMPLETED | OUTPATIENT
Start: 2024-01-11 | End: 2024-01-11

## 2024-01-11 RX ORDER — ONDANSETRON HYDROCHLORIDE 2 MG/ML
4 INJECTION, SOLUTION INTRAVENOUS DAILY PRN
Status: DISCONTINUED | OUTPATIENT
Start: 2024-01-11 | End: 2024-01-11 | Stop reason: HOSPADM

## 2024-01-11 RX ORDER — GLYCOPYRROLATE 0.2 MG/ML
INJECTION INTRAMUSCULAR; INTRAVENOUS
Status: DISCONTINUED | OUTPATIENT
Start: 2024-01-11 | End: 2024-01-11

## 2024-01-11 RX ORDER — ROCURONIUM BROMIDE 10 MG/ML
INJECTION, SOLUTION INTRAVENOUS
Status: DISCONTINUED | OUTPATIENT
Start: 2024-01-11 | End: 2024-01-11

## 2024-01-11 RX ORDER — BUPIVACAINE HYDROCHLORIDE 2.5 MG/ML
INJECTION, SOLUTION EPIDURAL; INFILTRATION; INTRACAUDAL
Status: DISCONTINUED
Start: 2024-01-11 | End: 2024-01-11 | Stop reason: HOSPADM

## 2024-01-11 RX ORDER — FENTANYL CITRATE 50 UG/ML
INJECTION, SOLUTION INTRAMUSCULAR; INTRAVENOUS
Status: DISCONTINUED | OUTPATIENT
Start: 2024-01-11 | End: 2024-01-11

## 2024-01-11 RX ORDER — DEXAMETHASONE SODIUM PHOSPHATE 4 MG/ML
INJECTION, SOLUTION INTRA-ARTICULAR; INTRALESIONAL; INTRAMUSCULAR; INTRAVENOUS; SOFT TISSUE
Status: DISCONTINUED | OUTPATIENT
Start: 2024-01-11 | End: 2024-01-11

## 2024-01-11 RX ORDER — HYDROCODONE BITARTRATE AND ACETAMINOPHEN 7.5; 325 MG/1; MG/1
1 TABLET ORAL EVERY 6 HOURS PRN
Qty: 28 TABLET | Refills: 0 | Status: ON HOLD | OUTPATIENT
Start: 2024-01-11 | End: 2024-04-25 | Stop reason: HOSPADM

## 2024-01-11 RX ORDER — SODIUM CHLORIDE, SODIUM LACTATE, POTASSIUM CHLORIDE, CALCIUM CHLORIDE 600; 310; 30; 20 MG/100ML; MG/100ML; MG/100ML; MG/100ML
INJECTION, SOLUTION INTRAVENOUS CONTINUOUS
Status: ACTIVE | OUTPATIENT
Start: 2024-01-11

## 2024-01-11 RX ORDER — HYDROMORPHONE HYDROCHLORIDE 2 MG/ML
0.4 INJECTION, SOLUTION INTRAMUSCULAR; INTRAVENOUS; SUBCUTANEOUS EVERY 5 MIN PRN
Status: DISCONTINUED | OUTPATIENT
Start: 2024-01-11 | End: 2024-01-11 | Stop reason: HOSPADM

## 2024-01-11 RX ORDER — CEFAZOLIN SODIUM 1 G/3ML
2 INJECTION, POWDER, FOR SOLUTION INTRAMUSCULAR; INTRAVENOUS
Status: DISCONTINUED | OUTPATIENT
Start: 2024-01-11 | End: 2024-01-11 | Stop reason: HOSPADM

## 2024-01-11 RX ORDER — DEXMEDETOMIDINE HYDROCHLORIDE 100 UG/ML
INJECTION, SOLUTION INTRAVENOUS
Status: DISCONTINUED | OUTPATIENT
Start: 2024-01-11 | End: 2024-01-11

## 2024-01-11 RX ORDER — BUPIVACAINE HYDROCHLORIDE 2.5 MG/ML
INJECTION, SOLUTION EPIDURAL; INFILTRATION; INTRACAUDAL
Status: DISCONTINUED | OUTPATIENT
Start: 2024-01-11 | End: 2024-01-11 | Stop reason: HOSPADM

## 2024-01-11 RX ORDER — SODIUM CHLORIDE, SODIUM GLUCONATE, SODIUM ACETATE, POTASSIUM CHLORIDE AND MAGNESIUM CHLORIDE 30; 37; 368; 526; 502 MG/100ML; MG/100ML; MG/100ML; MG/100ML; MG/100ML
INJECTION, SOLUTION INTRAVENOUS CONTINUOUS
Status: DISCONTINUED | OUTPATIENT
Start: 2024-01-11 | End: 2024-01-11 | Stop reason: HOSPADM

## 2024-01-11 RX ORDER — ONDANSETRON HYDROCHLORIDE 2 MG/ML
4 INJECTION, SOLUTION INTRAVENOUS EVERY 4 HOURS PRN
Status: DISCONTINUED | OUTPATIENT
Start: 2024-01-11 | End: 2024-01-11 | Stop reason: HOSPADM

## 2024-01-11 RX ORDER — ONDANSETRON HYDROCHLORIDE 2 MG/ML
INJECTION, SOLUTION INTRAMUSCULAR; INTRAVENOUS
Status: DISCONTINUED | OUTPATIENT
Start: 2024-01-11 | End: 2024-01-11

## 2024-01-11 RX ORDER — TRAMADOL HYDROCHLORIDE 50 MG/1
50 TABLET ORAL EVERY 4 HOURS PRN
Status: DISCONTINUED | OUTPATIENT
Start: 2024-01-11 | End: 2024-01-11 | Stop reason: HOSPADM

## 2024-01-11 RX ORDER — CEFAZOLIN SODIUM 1 G/3ML
INJECTION, POWDER, FOR SOLUTION INTRAMUSCULAR; INTRAVENOUS
Status: DISCONTINUED
Start: 2024-01-11 | End: 2024-01-11 | Stop reason: HOSPADM

## 2024-01-11 RX ADMIN — PROPOFOL 200 MG: 10 INJECTION, EMULSION INTRAVENOUS at 10:01

## 2024-01-11 RX ADMIN — DEXAMETHASONE SODIUM PHOSPHATE 8 MG: 4 INJECTION, SOLUTION INTRA-ARTICULAR; INTRALESIONAL; INTRAMUSCULAR; INTRAVENOUS; SOFT TISSUE at 10:01

## 2024-01-11 RX ADMIN — ACETAMINOPHEN 1000 MG: 500 TABLET ORAL at 09:01

## 2024-01-11 RX ADMIN — DEXMEDETOMIDINE 6 MCG: 200 INJECTION, SOLUTION INTRAVENOUS at 10:01

## 2024-01-11 RX ADMIN — CEFAZOLIN 2 G: 330 INJECTION, POWDER, FOR SOLUTION INTRAMUSCULAR; INTRAVENOUS at 10:01

## 2024-01-11 RX ADMIN — ONDANSETRON 4 MG: 2 INJECTION INTRAMUSCULAR; INTRAVENOUS at 10:01

## 2024-01-11 RX ADMIN — HYDROCODONE BITARTRATE AND ACETAMINOPHEN 1 TABLET: 7.5; 325 TABLET ORAL at 02:01

## 2024-01-11 RX ADMIN — FENTANYL CITRATE 50 MCG: 50 INJECTION, SOLUTION INTRAMUSCULAR; INTRAVENOUS at 11:01

## 2024-01-11 RX ADMIN — MIDAZOLAM HYDROCHLORIDE 2 MG: 1 INJECTION, SOLUTION INTRAMUSCULAR; INTRAVENOUS at 10:01

## 2024-01-11 RX ADMIN — ROCURONIUM BROMIDE 50 MG: 50 INJECTION INTRAVENOUS at 10:01

## 2024-01-11 RX ADMIN — GABAPENTIN 300 MG: 300 CAPSULE ORAL at 09:01

## 2024-01-11 RX ADMIN — GLYCOPYRROLATE 0.2 MG: 0.2 INJECTION INTRAMUSCULAR; INTRAVENOUS at 10:01

## 2024-01-11 RX ADMIN — SODIUM CHLORIDE, POTASSIUM CHLORIDE, SODIUM LACTATE AND CALCIUM CHLORIDE: 600; 310; 30; 20 INJECTION, SOLUTION INTRAVENOUS at 09:01

## 2024-01-11 RX ADMIN — SUGAMMADEX 200 MG: 100 INJECTION, SOLUTION INTRAVENOUS at 11:01

## 2024-01-11 RX ADMIN — LIDOCAINE HYDROCHLORIDE 50 MG: 10 INJECTION, SOLUTION EPIDURAL; INFILTRATION; INTRACAUDAL; PERINEURAL at 10:01

## 2024-01-11 RX ADMIN — FENTANYL CITRATE 100 MCG: 50 INJECTION, SOLUTION INTRAMUSCULAR; INTRAVENOUS at 10:01

## 2024-01-11 RX ADMIN — SODIUM CHLORIDE, POTASSIUM CHLORIDE, SODIUM LACTATE AND CALCIUM CHLORIDE: 600; 310; 30; 20 INJECTION, SOLUTION INTRAVENOUS at 11:01

## 2024-01-11 RX ADMIN — ONDANSETRON 4 MG: 4 TABLET, ORALLY DISINTEGRATING ORAL at 09:01

## 2024-01-11 NOTE — ANESTHESIA PREPROCEDURE EVALUATION
01/11/2024  Augustine Sarah is a 68 y.o., male who presents with Left Inguinal Bulge/Mass due to Hernia.  Diagnosis:   Left inguinal hernia [K40.90]   Pre-op diagnosis: Left inguinal hernia [K40.90]       The pt.comes to [facility] for the noted procedure under [facility].  Procedure:   REPAIR, HERNIA, INGUINAL, LAPAROSCOPIC  Left (Left: Groin) - lap left inguinal hernia repair   Anesthesia type: Gen ETT         PMHx:  Other Medical History   Hypertension Thyroid disease     Surgical History    COLONOSCOPY INGUINAL HERNIA REPAIR   INGUINAL HERNIA REPAIR THYROIDECTOMY         Vital signs:        Lab Data:      EKG:  Sinus rhythm. Borderline LVH/LAD.Rt:77    Pre-op Assessment    I have reviewed the Patient Summary Reports.     I have reviewed the Nursing Notes. I have reviewed the NPO Status.   I have reviewed the Medications.     Review of Systems  Anesthesia Hx:  No problems with previous Anesthesia                Social:  Non-Smoker       Hematology/Oncology:  Hematology Normal   Oncology Normal                                   EENT/Dental:  EENT/Dental Normal           Cardiovascular:  Exercise tolerance: good   Hypertension                Functional Capacity good / => 4 METS                         Pulmonary:  Pulmonary Normal                       Renal/:  Renal/ Normal                 Hepatic/GI:  Hepatic/GI Normal                 Musculoskeletal:  Musculoskeletal Normal                Neurological:  Neurology Normal                                      Endocrine:  Endocrine Normal            Dermatological:  Skin Normal    Psych:  Psychiatric Normal                    Physical Exam  General: Alert, Oriented, Well nourished and Cooperative    Airway:  Mallampati: II   Mouth Opening: Normal  TM Distance: Normal  Tongue: Normal  Neck ROM: Normal ROM    Dental:  Intact    Chest/Lungs:  Clear to auscultation, Normal  Respiratory Rate    Heart:  Rate: Normal  Rhythm: Regular Rhythm        Anesthesia Plan  Type of Anesthesia, risks & benefits discussed:    Anesthesia Type: Gen ETT  Intra-op Monitoring Plan: Standard ASA Monitors  Post Op Pain Control Plan: IV/PO Opioids PRN  Induction:  IV and Inhalation  Airway Plan: Direct  Informed Consent: Informed consent signed with the Patient and all parties understand the risks and agree with anesthesia plan.  All questions answered. Patient consented to blood products? Yes  ASA Score: 2  Day of Surgery Review of History & Physical: H&P Update referred to the surgeon/provider.    Ready For Surgery From Anesthesia Perspective.     .

## 2024-01-11 NOTE — ANESTHESIA PROCEDURE NOTES
Intubation    Date/Time: 1/11/2024 10:38 AM    Performed by: Angelica Snider CRNA  Authorized by: Eliecer Carter MD    Intubation:     Induction:  Intravenous    Intubated:  Postinduction    Mask Ventilation:  Easy with oral airway    Attempts:  1    Attempted By:  CRNA    Method of Intubation:  Direct    Blade:  Muñoz 2    Laryngeal View Grade: Grade IIA - cords partially seen      Difficult Airway Encountered?: No      Complications:  None    Airway Device:  Oral endotracheal tube    Airway Device Size:  7.5    Style/Cuff Inflation:  Cuffed (inflated to minimal occlusive pressure)    Inflation Amount (mL):  6    Tube secured:  23    Secured at:  The lips    Placement Verified By:  Capnometry    Complicating Factors:  None    Findings Post-Intubation:  BS equal bilateral

## 2024-01-11 NOTE — TRANSFER OF CARE
"Anesthesia Transfer of Care Note    Patient: Augustine Sarah    Procedure(s) Performed: Procedure(s) (LRB):  REPAIR, HERNIA, INGUINAL, LAPAROSCOPIC  Left (Left)    Patient location: PACU    Anesthesia Type: general    Transport from OR: Transported from OR on room air with adequate spontaneous ventilation    Post pain: adequate analgesia    Post assessment: no apparent anesthetic complications and tolerated procedure well    Post vital signs: stable    Level of consciousness: responds to stimulation    Nausea/Vomiting: nausea    Complications: none    Transfer of care protocol was followed      Last vitals: Visit Vitals  BP (!) 167/77   Pulse 78   Temp 36.6 °C (97.9 °F) (Oral)   Resp 18   Ht 5' 9" (1.753 m)   Wt 93.2 kg (205 lb 7.5 oz)   SpO2 98%   BMI 30.34 kg/m²     "

## 2024-01-11 NOTE — H&P
"HISTORY & PHYSICAL  General Surgery    Patient Name: Augustine Sarah  YOB: 1955    Date: 01/11/2024                     PRESENTING HISTORY     Chief Complaint/Reason for Admission: "left inguinal hernia"    History of Present Illness:  Mr. Augustine Sarah is a 68 y.o. male who reports he started noticing a bulge in his left groin.  It occasionally causes some pain and discomfort. He has a history of strenuous activity/lifting in the past.  Denies any changes to bowel / bladder habits. He denies CP/SOB or fever/chills.     Review of Systems:  12 point ROS negative except as stated in HPI    PAST HISTORY:     Past Medical History:   Diagnosis Date    Hypertension     Left inguinal hernia     PONV (postoperative nausea and vomiting)     Thyroid disease        Past Surgical History:   Procedure Laterality Date    COLONOSCOPY  03/09/2020    INGUINAL HERNIA REPAIR Left     INGUINAL HERNIA REPAIR Right     right knee arthroscopy      THYROIDECTOMY  2018       Family History   Problem Relation Age of Onset    Hypertension Mother     Hypertension Father        Social History     Socioeconomic History    Marital status:    Tobacco Use    Smoking status: Never    Smokeless tobacco: Never   Substance and Sexual Activity    Alcohol use: Yes     Alcohol/week: 1.0 - 2.0 standard drink of alcohol     Types: 1 - 2 Drinks containing 0.5 oz of alcohol per week    Drug use: Not Currently    Sexual activity: Not Currently       MEDICATIONS & ALLERGIES:     No current facility-administered medications on file prior to encounter.     Current Outpatient Medications on File Prior to Encounter   Medication Sig    co-enzyme Q-10 30 mg capsule Take 30 mg by mouth once daily.    levothyroxine (SYNTHROID) 150 MCG tablet Take 150 mcg by mouth before breakfast.    losartan (COZAAR) 50 MG tablet Take 1 tablet (50 mg total) by mouth once daily.       Review of patient's allergies indicates:  No Known Allergies    OBJECTIVE: " "    Vital Signs:  Temp:  [97.9 °F (36.6 °C)] 97.9 °F (36.6 °C)  Pulse:  [78] 78  Resp:  [18] 18  SpO2:  [98 %] 98 %  BP: (167)/(77) 167/77  Body mass index is 30.34 kg/m².     Physical Exam:  General:  Well developed, well nourished, no acute distress  HEENT:  Normocephalic, atraumatic, PERRL, EOMI, clear sclera, ears normal, neck supple, throat clear without erythema or exudates  CVS:  RRR, S1 and S2 normal, no murmurs, rubs, gallops  Resp:  Lungs clear to auscultation, no wheezes, rales, rhonchi, cough  GI:  Abdomen soft, non-tender, non-distended, normoactive bowel sounds, no masses  :  + Left inguinal hernia, NTTP; No hernia appreciated on the right  MSK:  No muscle atrophy, cyanosis, peripheral edema, full range of motion  Skin:  No rashes, ulcers, erythema  Neuro:  CNII-XII grossly intact  Psych:  Alert and oriented to person, place, and time    Laboratory  Troponin:  No results for input(s): "TROPONINI" in the last 72 hours.  CBC:  Recent Labs     01/08/24  1515   WBC 6.40   RBC 5.06   HGB 14.8   HCT 43.8      MCV 86.6   MCH 29.2   MCHC 33.8     CMP:  Recent Labs     01/08/24  1515   CALCIUM 10.0   ALBUMIN 4.5      K 4.9   CO2 29   BUN 22.0*   CREATININE 1.26   ALKPHOS 89   ALT 26   AST 20   BILITOT 0.4       Diagnostic Results:        ASSESSMENT & PLAN:   Mr. Augustine Sarah is a 68 y.o. male with left inguinal hernia    Plan:  - Laparoscopic Left Inguinal Hernia  - Preop workup        Jazmín FAITH HackettStacia  General Surgery    1/11/2024  9:56 AM    "

## 2024-01-11 NOTE — DISCHARGE SUMMARY
[facility] Surgical - Periop Services  Discharge Note  Short Stay    Procedure(s) (LRB):  REPAIR, HERNIA, INGUINAL, LAPAROSCOPIC  Left (Left)      OUTCOME: Patient tolerated treatment/procedure well without complication and is now ready for discharge.    DISPOSITION: Home or Self Care    FINAL DIAGNOSIS:  Left inguinal hernia    FOLLOWUP: In clinic    DISCHARGE INSTRUCTIONS:  No discharge procedures on file.     TIME SPENT ON DISCHARGE:    minutes

## 2024-01-13 VITALS
HEIGHT: 69 IN | WEIGHT: 205.5 LBS | SYSTOLIC BLOOD PRESSURE: 142 MMHG | HEART RATE: 62 BPM | BODY MASS INDEX: 30.44 KG/M2 | DIASTOLIC BLOOD PRESSURE: 89 MMHG | OXYGEN SATURATION: 83 % | TEMPERATURE: 98 F | RESPIRATION RATE: 16 BRPM

## 2024-01-13 NOTE — ANESTHESIA POSTPROCEDURE EVALUATION
Anesthesia Post Evaluation    Patient: Augustine Sarah    Procedure(s) Performed: Procedure(s) (LRB):  REPAIR, HERNIA, INGUINAL, LAPAROSCOPIC  Left (Left)    Final Anesthesia Type: general      Patient location during evaluation: PACU  Patient participation: Yes- Able to Participate  Level of consciousness: awake and alert and oriented  Post-procedure vital signs: reviewed and stable  Pain management: adequate  Airway patency: patent  JOSUÉ mitigation strategies: Verification of full reversal of neuromuscular block  PONV status at discharge: No PONV  Anesthetic complications: no      Cardiovascular status: blood pressure returned to baseline and stable  Respiratory status: spontaneous ventilation and unassisted  Hydration status: euvolemic  Follow-up not needed.  Comments: [facility]              Vitals Value Taken Time   /89 01/11/24 1451   Temp 36.4 °C (97.5 °F) 01/11/24 1156   Pulse 62 01/11/24 1502   Resp 16 01/11/24 1451   SpO2 83 % 01/11/24 1502         Event Time   Out of Recovery 12:48:00         Pain/Maryjane Score: No data recorded

## 2024-01-16 ENCOUNTER — TELEPHONE (OUTPATIENT)
Dept: SURGERY | Facility: CLINIC | Age: 69
End: 2024-01-16
Payer: COMMERCIAL

## 2024-01-17 ENCOUNTER — OFFICE VISIT (OUTPATIENT)
Dept: SURGERY | Facility: CLINIC | Age: 69
End: 2024-01-17
Payer: COMMERCIAL

## 2024-01-17 VITALS
WEIGHT: 203.38 LBS | HEART RATE: 67 BPM | DIASTOLIC BLOOD PRESSURE: 87 MMHG | BODY MASS INDEX: 30.12 KG/M2 | HEIGHT: 69 IN | SYSTOLIC BLOOD PRESSURE: 158 MMHG

## 2024-01-17 DIAGNOSIS — Z98.890 POST-OPERATIVE STATE: Primary | ICD-10-CM

## 2024-01-17 PROCEDURE — 3079F DIAST BP 80-89 MM HG: CPT | Mod: CPTII,,, | Performed by: SURGERY

## 2024-01-17 PROCEDURE — 3077F SYST BP >= 140 MM HG: CPT | Mod: CPTII,,, | Performed by: SURGERY

## 2024-01-17 PROCEDURE — 99024 POSTOP FOLLOW-UP VISIT: CPT | Mod: ,,, | Performed by: SURGERY

## 2024-01-17 PROCEDURE — 1160F RVW MEDS BY RX/DR IN RCRD: CPT | Mod: CPTII,,, | Performed by: SURGERY

## 2024-01-17 PROCEDURE — 1159F MED LIST DOCD IN RCRD: CPT | Mod: CPTII,,, | Performed by: SURGERY

## 2024-01-17 NOTE — PROGRESS NOTES
Post Operative Progress Note  General Surgery    Patient Name: Augustine Sarah  YOB: 1955    Date: 01/17/2024                   SUBJECTIVE:     Chief Complaint/Reason for Admission:   Chief Complaint   Patient presents with    Follow-up     FUBYP/RNY 12/10/18         History of Present Illness:  Mr. Augustine Sarah is a 68 y.o. male who is 1 weeks post op surgery.  The patient states he was pushing up on a window when he felt something in his operative groin.  He thinks he may have a recurrence.  Denies any changes to bowel / bladder habits.  Denies any fever / chills.     Review of Systems:  12 point ROS negative except as stated in HPI    PAST HISTORY:     Past Medical History:   Diagnosis Date    Hypertension     Left inguinal hernia     PONV (postoperative nausea and vomiting)     Thyroid disease      Past Surgical History:   Procedure Laterality Date    COLONOSCOPY  03/09/2020    INGUINAL HERNIA REPAIR Left     INGUINAL HERNIA REPAIR Right     REPAIR, HERNIA, INGUINAL, LAPAROSCOPIC Left 1/11/2024    Procedure: REPAIR, HERNIA, INGUINAL, LAPAROSCOPIC  Left;  Surgeon: Sanjay Marsh Jr., MD;  Location: [facility];  Service: General;  Laterality: Left;  lap left inguinal hernia repair    right knee arthroscopy      THYROIDECTOMY  2018     Family History   Problem Relation Age of Onset    Hypertension Mother     Hypertension Father      Social History     Socioeconomic History    Marital status:    Tobacco Use    Smoking status: Some Days     Types: Cigars    Smokeless tobacco: Never   Substance and Sexual Activity    Alcohol use: Yes     Alcohol/week: 1.0 - 2.0 standard drink of alcohol     Types: 1 - 2 Drinks containing 0.5 oz of alcohol per week     Comment: weekly    Drug use: Not Currently    Sexual activity: Not Currently       MEDICATIONS & ALLERGIES:     Current Outpatient Medications on File Prior to Visit   Medication Sig    co-enzyme Q-10 30 mg capsule Take 30 mg by mouth once daily.     "HYDROcodone-acetaminophen (NORCO) 7.5-325 mg per tablet Take 1 tablet by mouth every 6 (six) hours as needed for Pain.    levothyroxine (SYNTHROID) 150 MCG tablet Take 150 mcg by mouth before breakfast.    losartan (COZAAR) 50 MG tablet Take 1 tablet (50 mg total) by mouth once daily.     Current Facility-Administered Medications on File Prior to Visit   Medication    lactated ringers infusion     Review of patient's allergies indicates:  No Known Allergies    OBJECTIVE:   Visit Vitals  BP (!) 158/87 (BP Location: Right arm, Patient Position: Sitting)   Pulse 67   Ht 5' 9" (1.753 m)   Wt 92.3 kg (203 lb 6.4 oz)   BMI 30.04 kg/m²       Physical Exam:  General:  Well developed, well nourished, no acute distress  GI:  Abdomen soft, non-tender, non-distended, normoactive bowel sounds, no masses  Skin:  Incision Clean/Dry/Intact    VISIT DIAGNOSES:       ICD-10-CM ICD-9-CM   1. Post-operative state  Z98.890 V45.89       ASSESSMENT/PLAN:     68 y.o. male who is s/p Lap Left Inguinal Hernia     -  Pt doing well - no pain wounds healing well  -  Questionable recurrence at this time   -  F/U in 6 weeks for reevaluation / may need CT scan    RTC PRN        "

## 2024-01-26 NOTE — OP NOTE
PATIENT:  Augustine Sarah      : 1955       DATE OF SURGERY:   2024          SURGEON:  Sanjay Marsh MD         ASSISTANT:  Jazmín Dominguez NP (First Assistant)            PREOPERATIVE DIAGNOSIS:  Recurrent Left inguinal hernia.           POST OPERATIVE DIAGNOSIS:  Same.           PROCEDURE:  Laparoscopic Left inguinal  hernia repair with mesh.           ANESTHESIA:  General endotracheal anesthesia.           ESTIMATED BLOOD LOSS:  Less than 20 cc.   Blood administered,  none.           LAP AND INSTRUMENT COUNTS:  Correct X2 at the end of the case.           INDICATIONS AND SIGNIFICANT HISTORY:  Patient is a 68 y.o.-year-old male with a long history of heavy lifting that presented with complaint of a recurrent Left inguinal hernia.  Risks and benefits of surgery were discussed with the patient.  Patient voiced understanding of risks and benefit and elected to proceed with surgery.           PROCEDURE IN DETAIL:  Once informed consents were obtained, the patient was taken to the operating room and place supine on the operating table.  General endotracheal anesthesia was induced.  The abdomen was prepped and draped in a standard sterile surgical fashion.  Periumbilical incision was made with the scalpel upon which the dissection was carried down to the level of the anterior rectus fascia which was opend sharply.  The rectus muscles then retracted laterally until the posterior rectus sheath was visualized.  The Jama tipped trocar port was then placed and preperitoneal space was insufflated with 10 millimeters of pressure.  Using blunt dissection, the preperitoneal space was dissected to the pubic tubercle.  Two additional 5 millimeter trocar ports were placed suprapubic in the midline.  Attention was then directed to the left pubic area.  Dissection was carried out in a medial to lateral fashion.  This started at the pubic tubercle until the lacunar ligament was visualized.  Then dissection carried out  over the inguinal cord structures until lateral abdominal wall was adequately visualized.  The cord structures were then dissected appropriately with the peritoneal reflection was dissected in it's entirety off the cord structures and off the vas deferens.  A Parietex anatomical left mesh was placed in the appropriate fashion to cover the entire myopectineal orifice of [place].  This was then securedabove the pubic bone with a tack applier in the midline superiorly and laterally above the inguinal ligament.  The insufflation was then removed under direct visualization.  All ports were then removed with no active bleeding noted.  The anterior rectus sheath was closed with 0 Vicryl suture in a figure of eight fashion.  The skin was then closed with 4-0 Vicryl suture interrupted fashion.  The skin was clean, dry and sterile dressing was placed on the wound.  Lap and instrument counts were correct X2 at the end of the case. Jazmín Dominguez was present during the entirety of the case and was critical in retraction for proper dissection.  The patient tolerated the procedure well and was transported to recovery room in good condition.

## 2024-01-26 NOTE — PROGRESS NOTES
HISTORY & PHYSICAL  General Surgery    Patient Name: Augustine Sarah  YOB: 1955    Date: 01/26/2024                   SUBJECTIVE:     Chief Complaint/Reason for Admission: Consult (Left inguinal hernia)       History of Present Illness:  Mr. Augustine Sarah is a 68 y.o. male who reports he started noticing a bulge in his left groin.  It occasionally causes some pain and discomfort. He has a history of strenuous activity/lifting in the past.  Denies any changes to bowel / bladder habits. He denies CP/SOB or fever/chills.     Review of Systems:  12 point ROS negative except as stated in HPI    HISTORY:     Past Medical History:   Diagnosis Date    Hypertension     Left inguinal hernia     PONV (postoperative nausea and vomiting)     Thyroid disease      Past Surgical History:   Procedure Laterality Date    COLONOSCOPY  03/09/2020    INGUINAL HERNIA REPAIR Left     INGUINAL HERNIA REPAIR Right     REPAIR, HERNIA, INGUINAL, LAPAROSCOPIC Left 1/11/2024    Procedure: REPAIR, HERNIA, INGUINAL, LAPAROSCOPIC  Left;  Surgeon: Sanjay Marsh Jr., MD;  Location: [facility];  Service: General;  Laterality: Left;  lap left inguinal hernia repair    right knee arthroscopy      THYROIDECTOMY  2018     Family History   Problem Relation Age of Onset    Hypertension Mother     Hypertension Father      Social History     Socioeconomic History    Marital status:    Tobacco Use    Smoking status: Some Days     Types: Cigars    Smokeless tobacco: Never   Substance and Sexual Activity    Alcohol use: Yes     Alcohol/week: 1.0 - 2.0 standard drink of alcohol     Types: 1 - 2 Drinks containing 0.5 oz of alcohol per week     Comment: weekly    Drug use: Not Currently    Sexual activity: Not Currently       MEDICATIONS & ALLERGIES:     Current Outpatient Medications on File Prior to Visit   Medication Sig    levothyroxine (SYNTHROID) 150 MCG tablet Take 150 mcg by mouth before breakfast.    losartan (COZAAR) 50 MG tablet Take  "1 tablet (50 mg total) by mouth once daily.     No current facility-administered medications on file prior to visit.     Review of patient's allergies indicates:  No Known Allergies    OBJECTIVE:     Vitals:    12/20/23 1026 12/20/23 1027   BP: (!) 146/84 137/80   BP Location: Left arm Right arm   Patient Position: Sitting Sitting   Pulse: 76    Weight: 95.9 kg (211 lb 6.4 oz)    Height: 5' 9" (1.753 m)       Body mass index is 31.22 kg/m².     Physical Exam:  General:  Well developed, well nourished, no acute distress  HEENT:  Normocephalic, atraumatic, PERRL, EOMI, clear sclera, ears normal, neck supple, throat clear without erythema or exudates  CVS:  RRR, S1 and S2 normal, no murmurs, rubs, gallops  Resp:  Lungs clear to auscultation, no wheezes, rales, rhonchi, cough  GI:  Abdomen soft, non-tender, non-distended, normoactive bowel sounds, no masses,    :   R groin - no hernia appreciated, nttp, descended testicle  L groin - Reducible Left Inguinal Hernia, nttp, descended testicle  MSK:  No muscle atrophy, cyanosis, peripheral edema, full range of motion  Skin:  No rashes, ulcers, erythema  Neuro:  CNII-XII grossly intact  Psych:  Alert and oriented to person, place, and time    Results:  I have independently reviewed all pertinent lab and radiologic studies relevant to general/bariatric surgery.    ASSESSMENT & PLAN:   Diagnoses:    ICD-10-CM ICD-9-CM   1. Left inguinal hernia  K40.90 550.90        Plan: 69 yo male with a Recurrent Left Inguinal Hernia    Laparoscopic Left Inguinal Hernia Repair  Preoperative workup as ordered      "

## 2024-02-26 ENCOUNTER — OFFICE VISIT (OUTPATIENT)
Dept: SURGERY | Facility: CLINIC | Age: 69
End: 2024-02-26
Payer: COMMERCIAL

## 2024-02-26 VITALS
WEIGHT: 203 LBS | SYSTOLIC BLOOD PRESSURE: 163 MMHG | TEMPERATURE: 99 F | BODY MASS INDEX: 30.07 KG/M2 | DIASTOLIC BLOOD PRESSURE: 79 MMHG | HEIGHT: 69 IN | HEART RATE: 71 BPM

## 2024-02-26 DIAGNOSIS — K40.90 LEFT INGUINAL HERNIA: Primary | ICD-10-CM

## 2024-02-26 PROCEDURE — 4010F ACE/ARB THERAPY RXD/TAKEN: CPT | Mod: CPTII,,, | Performed by: SURGERY

## 2024-02-26 PROCEDURE — 3008F BODY MASS INDEX DOCD: CPT | Mod: CPTII,,, | Performed by: SURGERY

## 2024-02-26 PROCEDURE — 1159F MED LIST DOCD IN RCRD: CPT | Mod: CPTII,,, | Performed by: SURGERY

## 2024-02-26 PROCEDURE — 3077F SYST BP >= 140 MM HG: CPT | Mod: CPTII,,, | Performed by: SURGERY

## 2024-02-26 PROCEDURE — 1160F RVW MEDS BY RX/DR IN RCRD: CPT | Mod: CPTII,,, | Performed by: SURGERY

## 2024-02-26 PROCEDURE — 3078F DIAST BP <80 MM HG: CPT | Mod: CPTII,,, | Performed by: SURGERY

## 2024-02-26 PROCEDURE — 99214 OFFICE O/P EST MOD 30 MIN: CPT | Mod: ,,, | Performed by: SURGERY

## 2024-02-26 PROCEDURE — 1126F AMNT PAIN NOTED NONE PRSNT: CPT | Mod: CPTII,,, | Performed by: SURGERY

## 2024-03-01 DIAGNOSIS — K40.90 LEFT INGUINAL HERNIA: Primary | ICD-10-CM

## 2024-03-11 ENCOUNTER — TELEPHONE (OUTPATIENT)
Dept: SURGERY | Facility: CLINIC | Age: 69
End: 2024-03-11
Payer: COMMERCIAL

## 2024-03-18 ENCOUNTER — TELEPHONE (OUTPATIENT)
Dept: SURGERY | Facility: CLINIC | Age: 69
End: 2024-03-18
Payer: COMMERCIAL

## 2024-03-18 NOTE — TELEPHONE ENCOUNTER
Pt had to cancel open left inguinal repair last week- called today wanting to r/s  He would like to r/s to 4/25/24

## 2024-03-20 NOTE — PROGRESS NOTES
HISTORY & PHYSICAL  General Surgery    Patient Name: Augustine Sarah  YOB: 1955    Date: 03/19/2024                   SUBJECTIVE:     Chief Complaint/Reason for Admission: Post-op Evaluation (Lap left inguinal hernia repair 1/11/24)       History of Present Illness:  Mr. Augustine Sarah is a 68 y.o. male who underwent a Laparoscopic Left Inguinal Hernia but early postoperatively was opening a window with straining and felt a discomfort in his Left groin and then reports he started noticing a bulge in his left groin consistent with recurrence.  Denies any changes to bowel / bladder habits. He denies CP/SOB or fever/chills.     Review of Systems:  12 point ROS negative except as stated in HPI    HISTORY:     Past Medical History:   Diagnosis Date    Hypertension     Left inguinal hernia     PONV (postoperative nausea and vomiting)     Thyroid disease      Past Surgical History:   Procedure Laterality Date    COLONOSCOPY  03/09/2020    INGUINAL HERNIA REPAIR Left     INGUINAL HERNIA REPAIR Right     REPAIR, HERNIA, INGUINAL, LAPAROSCOPIC Left 1/11/2024    Procedure: REPAIR, HERNIA, INGUINAL, LAPAROSCOPIC  Left;  Surgeon: Sanjay Marsh Jr., MD;  Location: [facility];  Service: General;  Laterality: Left;  lap left inguinal hernia repair    right knee arthroscopy      THYROIDECTOMY  2018     Family History   Problem Relation Age of Onset    Hypertension Mother     Hypertension Father      Social History     Socioeconomic History    Marital status:    Tobacco Use    Smoking status: Some Days     Types: Cigars    Smokeless tobacco: Never   Substance and Sexual Activity    Alcohol use: Yes     Alcohol/week: 1.0 - 2.0 standard drink of alcohol     Types: 1 - 2 Drinks containing 0.5 oz of alcohol per week     Comment: weekly    Drug use: Not Currently    Sexual activity: Not Currently       MEDICATIONS & ALLERGIES:     Current Outpatient Medications on File Prior to Visit   Medication Sig    co-enzyme Q-10  "30 mg capsule Take 30 mg by mouth once daily.    HYDROcodone-acetaminophen (NORCO) 7.5-325 mg per tablet Take 1 tablet by mouth every 6 (six) hours as needed for Pain.    levothyroxine (SYNTHROID) 150 MCG tablet Take 150 mcg by mouth before breakfast.     Current Facility-Administered Medications on File Prior to Visit   Medication    lactated ringers infusion     Review of patient's allergies indicates:  No Known Allergies    OBJECTIVE:     Vitals:    02/26/24 1047   BP: (!) 163/79   Pulse: 71   Temp: 98.9 °F (37.2 °C)   Weight: 92.1 kg (203 lb)   Height: 5' 9" (1.753 m)      Body mass index is 29.98 kg/m².     Physical Exam:  General:  Well developed, well nourished, no acute distress  HEENT:  Normocephalic, atraumatic, PERRL, EOMI, clear sclera, ears normal, neck supple, throat clear without erythema or exudates  CVS:  RRR, S1 and S2 normal, no murmurs, rubs, gallops  Resp:  Lungs clear to auscultation, no wheezes, rales, rhonchi, cough  GI:  Abdomen soft, non-tender, non-distended, normoactive bowel sounds, no masses,    :   R groin - no hernia appreciated, nttp, descended testicle  L groin - Reducible Left Inguinal Hernia, nttp, descended testicle  MSK:  No muscle atrophy, cyanosis, peripheral edema, full range of motion  Skin:  No rashes, ulcers, erythema  Neuro:  CNII-XII grossly intact  Psych:  Alert and oriented to person, place, and time    Results:  I have independently reviewed all pertinent lab and radiologic studies relevant to general/bariatric surgery.    ASSESSMENT & PLAN:   Diagnoses:    ICD-10-CM ICD-9-CM   1. Left inguinal hernia  K40.90 550.90      67 yo male with Recurrent Left Inguinal Hernia    Plan:    Risks / Benefits of Recurrent hernia repair including potential nerve injury was discussed with the patient who voiced understanding and wishes to continue.    - Open Left Inguinal Hernia Repair  - Preoperative workup as ordered          "

## 2024-03-21 DIAGNOSIS — K40.90 LEFT INGUINAL HERNIA: Primary | ICD-10-CM

## 2024-04-22 RX ORDER — CEFAZOLIN SODIUM 2 G/50ML
2 SOLUTION INTRAVENOUS
Status: CANCELLED | OUTPATIENT
Start: 2024-04-22

## 2024-04-24 ENCOUNTER — ANESTHESIA EVENT (OUTPATIENT)
Dept: SURGERY | Facility: HOSPITAL | Age: 69
End: 2024-04-24
Payer: COMMERCIAL

## 2024-04-25 ENCOUNTER — HOSPITAL ENCOUNTER (OUTPATIENT)
Facility: HOSPITAL | Age: 69
Discharge: HOME | End: 2024-04-25
Attending: SURGERY | Admitting: SURGERY
Payer: COMMERCIAL

## 2024-04-25 ENCOUNTER — ANESTHESIA (OUTPATIENT)
Dept: SURGERY | Facility: HOSPITAL | Age: 69
End: 2024-04-25
Payer: COMMERCIAL

## 2024-04-25 DIAGNOSIS — K40.90 LEFT INGUINAL HERNIA: Primary | ICD-10-CM

## 2024-04-25 PROCEDURE — 63600175 PHARM REV CODE 636 W HCPCS: Performed by: NURSE ANESTHETIST, CERTIFIED REGISTERED

## 2024-04-25 PROCEDURE — 36000707: Performed by: SURGERY

## 2024-04-25 PROCEDURE — 63600175 PHARM REV CODE 636 W HCPCS: Performed by: SURGERY

## 2024-04-25 PROCEDURE — D9220A PRA ANESTHESIA: Mod: ANES,,, | Performed by: ANESTHESIOLOGY

## 2024-04-25 PROCEDURE — 49520 REREPAIR ING HERNIA REDUCE: CPT | Mod: AS,LT,,

## 2024-04-25 PROCEDURE — 25000003 PHARM REV CODE 250: Performed by: NURSE ANESTHETIST, CERTIFIED REGISTERED

## 2024-04-25 PROCEDURE — 71000015 HC POSTOP RECOV 1ST HR: Performed by: SURGERY

## 2024-04-25 PROCEDURE — 25000003 PHARM REV CODE 250: Performed by: ANESTHESIOLOGY

## 2024-04-25 PROCEDURE — C1781 MESH (IMPLANTABLE): HCPCS | Performed by: SURGERY

## 2024-04-25 PROCEDURE — 37000008 HC ANESTHESIA 1ST 15 MINUTES: Performed by: SURGERY

## 2024-04-25 PROCEDURE — C1729 CATH, DRAINAGE: HCPCS | Performed by: SURGERY

## 2024-04-25 PROCEDURE — D9220A PRA ANESTHESIA: Mod: CRNA,,, | Performed by: NURSE ANESTHETIST, CERTIFIED REGISTERED

## 2024-04-25 PROCEDURE — 25000003 PHARM REV CODE 250: Performed by: SURGERY

## 2024-04-25 PROCEDURE — 71000033 HC RECOVERY, INTIAL HOUR: Performed by: SURGERY

## 2024-04-25 PROCEDURE — 36000706: Performed by: SURGERY

## 2024-04-25 PROCEDURE — 37000009 HC ANESTHESIA EA ADD 15 MINS: Performed by: SURGERY

## 2024-04-25 PROCEDURE — 63600175 PHARM REV CODE 636 W HCPCS: Performed by: ANESTHESIOLOGY

## 2024-04-25 PROCEDURE — 63600175 PHARM REV CODE 636 W HCPCS

## 2024-04-25 PROCEDURE — 49520 REREPAIR ING HERNIA REDUCE: CPT | Mod: LT,,, | Performed by: SURGERY

## 2024-04-25 PROCEDURE — 71000016 HC POSTOP RECOV ADDL HR: Performed by: SURGERY

## 2024-04-25 DEVICE — POLYPROPLYLENE NONABSORBABLE SYNTHETIC SURGICAL MESH
Type: IMPLANTABLE DEVICE | Site: GROIN | Status: FUNCTIONAL
Brand: PROLENE

## 2024-04-25 RX ORDER — PROCHLORPERAZINE EDISYLATE 5 MG/ML
5 INJECTION INTRAMUSCULAR; INTRAVENOUS EVERY 30 MIN PRN
Status: DISCONTINUED | OUTPATIENT
Start: 2024-04-25 | End: 2024-04-25 | Stop reason: HOSPADM

## 2024-04-25 RX ORDER — ENOXAPARIN SODIUM 100 MG/ML
40 INJECTION SUBCUTANEOUS
Status: DISPENSED | OUTPATIENT
Start: 2024-04-25

## 2024-04-25 RX ORDER — ONDANSETRON HYDROCHLORIDE 2 MG/ML
4 INJECTION, SOLUTION INTRAVENOUS EVERY 4 HOURS PRN
Status: DISCONTINUED | OUTPATIENT
Start: 2024-04-25 | End: 2024-04-25 | Stop reason: HOSPADM

## 2024-04-25 RX ORDER — ROCURONIUM BROMIDE 10 MG/ML
INJECTION, SOLUTION INTRAVENOUS
Status: DISCONTINUED | OUTPATIENT
Start: 2024-04-25 | End: 2024-04-25

## 2024-04-25 RX ORDER — SODIUM CHLORIDE, SODIUM LACTATE, POTASSIUM CHLORIDE, CALCIUM CHLORIDE 600; 310; 30; 20 MG/100ML; MG/100ML; MG/100ML; MG/100ML
INJECTION, SOLUTION INTRAVENOUS CONTINUOUS
Status: ACTIVE | OUTPATIENT
Start: 2024-04-25

## 2024-04-25 RX ORDER — ONDANSETRON HYDROCHLORIDE 2 MG/ML
4 INJECTION, SOLUTION INTRAVENOUS DAILY PRN
Status: DISCONTINUED | OUTPATIENT
Start: 2024-04-25 | End: 2024-04-25 | Stop reason: HOSPADM

## 2024-04-25 RX ORDER — ACETAMINOPHEN 10 MG/ML
1000 INJECTION, SOLUTION INTRAVENOUS ONCE
Status: COMPLETED | OUTPATIENT
Start: 2024-04-25 | End: 2024-04-25

## 2024-04-25 RX ORDER — BUPIVACAINE HYDROCHLORIDE AND EPINEPHRINE 2.5; 5 MG/ML; UG/ML
INJECTION, SOLUTION EPIDURAL; INFILTRATION; INTRACAUDAL; PERINEURAL
Status: DISCONTINUED
Start: 2024-04-25 | End: 2024-04-25 | Stop reason: HOSPADM

## 2024-04-25 RX ORDER — CELECOXIB 200 MG/1
200 CAPSULE ORAL ONCE
Status: COMPLETED | OUTPATIENT
Start: 2024-04-25 | End: 2024-04-25

## 2024-04-25 RX ORDER — LIDOCAINE HYDROCHLORIDE 10 MG/ML
INJECTION, SOLUTION EPIDURAL; INFILTRATION; INTRACAUDAL; PERINEURAL
Status: DISCONTINUED | OUTPATIENT
Start: 2024-04-25 | End: 2024-04-25

## 2024-04-25 RX ORDER — SCOLOPAMINE TRANSDERMAL SYSTEM 1 MG/1
1 PATCH, EXTENDED RELEASE TRANSDERMAL ONCE
Status: DISCONTINUED | OUTPATIENT
Start: 2024-04-25 | End: 2024-04-25 | Stop reason: HOSPADM

## 2024-04-25 RX ORDER — HYDROCODONE BITARTRATE AND ACETAMINOPHEN 7.5; 325 MG/1; MG/1
1 TABLET ORAL EVERY 6 HOURS PRN
Qty: 28 TABLET | Refills: 0 | Status: SHIPPED | OUTPATIENT
Start: 2024-04-25

## 2024-04-25 RX ORDER — HYDROCODONE BITARTRATE AND ACETAMINOPHEN 7.5; 325 MG/1; MG/1
1 TABLET ORAL EVERY 6 HOURS PRN
Status: DISCONTINUED | OUTPATIENT
Start: 2024-04-25 | End: 2024-04-25 | Stop reason: HOSPADM

## 2024-04-25 RX ORDER — SODIUM CHLORIDE, SODIUM GLUCONATE, SODIUM ACETATE, POTASSIUM CHLORIDE AND MAGNESIUM CHLORIDE 30; 37; 368; 526; 502 MG/100ML; MG/100ML; MG/100ML; MG/100ML; MG/100ML
INJECTION, SOLUTION INTRAVENOUS CONTINUOUS
Status: DISCONTINUED | OUTPATIENT
Start: 2024-04-25 | End: 2024-04-25 | Stop reason: HOSPADM

## 2024-04-25 RX ORDER — MIDAZOLAM HYDROCHLORIDE 2 MG/2ML
1 INJECTION, SOLUTION INTRAMUSCULAR; INTRAVENOUS ONCE AS NEEDED
Status: COMPLETED | OUTPATIENT
Start: 2024-04-25 | End: 2024-04-25

## 2024-04-25 RX ORDER — METHOCARBAMOL 100 MG/ML
1000 INJECTION, SOLUTION INTRAMUSCULAR; INTRAVENOUS ONCE AS NEEDED
Status: COMPLETED | OUTPATIENT
Start: 2024-04-25 | End: 2024-04-25

## 2024-04-25 RX ORDER — BUPIVACAINE HYDROCHLORIDE AND EPINEPHRINE 2.5; 5 MG/ML; UG/ML
INJECTION, SOLUTION EPIDURAL; INFILTRATION; INTRACAUDAL; PERINEURAL
Status: DISCONTINUED | OUTPATIENT
Start: 2024-04-25 | End: 2024-04-25 | Stop reason: HOSPADM

## 2024-04-25 RX ORDER — METHOCARBAMOL 100 MG/ML
500 INJECTION, SOLUTION INTRAMUSCULAR; INTRAVENOUS ONCE AS NEEDED
Status: DISCONTINUED | OUTPATIENT
Start: 2024-04-25 | End: 2024-04-25

## 2024-04-25 RX ORDER — TRAMADOL HYDROCHLORIDE 50 MG/1
50 TABLET ORAL EVERY 4 HOURS PRN
Status: DISCONTINUED | OUTPATIENT
Start: 2024-04-25 | End: 2024-04-25 | Stop reason: HOSPADM

## 2024-04-25 RX ORDER — ONDANSETRON 4 MG/1
8 TABLET, ORALLY DISINTEGRATING ORAL EVERY 6 HOURS PRN
Status: DISCONTINUED | OUTPATIENT
Start: 2024-04-25 | End: 2024-04-25 | Stop reason: HOSPADM

## 2024-04-25 RX ORDER — MEPERIDINE HYDROCHLORIDE 25 MG/ML
12.5 INJECTION INTRAMUSCULAR; INTRAVENOUS; SUBCUTANEOUS EVERY 10 MIN PRN
Status: DISCONTINUED | OUTPATIENT
Start: 2024-04-25 | End: 2024-04-25 | Stop reason: HOSPADM

## 2024-04-25 RX ORDER — IPRATROPIUM BROMIDE AND ALBUTEROL SULFATE 2.5; .5 MG/3ML; MG/3ML
3 SOLUTION RESPIRATORY (INHALATION)
Status: DISCONTINUED | OUTPATIENT
Start: 2024-04-25 | End: 2024-04-25 | Stop reason: HOSPADM

## 2024-04-25 RX ORDER — CEFAZOLIN SODIUM 1 G/3ML
INJECTION, POWDER, FOR SOLUTION INTRAMUSCULAR; INTRAVENOUS
Status: DISCONTINUED
Start: 2024-04-25 | End: 2024-04-25 | Stop reason: HOSPADM

## 2024-04-25 RX ORDER — MIDAZOLAM HYDROCHLORIDE 1 MG/ML
INJECTION INTRAMUSCULAR; INTRAVENOUS
Status: COMPLETED
Start: 2024-04-25 | End: 2024-04-25

## 2024-04-25 RX ORDER — EPHEDRINE SULFATE 50 MG/ML
INJECTION, SOLUTION INTRAVENOUS
Status: DISCONTINUED | OUTPATIENT
Start: 2024-04-25 | End: 2024-04-25

## 2024-04-25 RX ORDER — CEFAZOLIN SODIUM 1 G/3ML
2 INJECTION, POWDER, FOR SOLUTION INTRAMUSCULAR; INTRAVENOUS
Status: DISCONTINUED | OUTPATIENT
Start: 2024-04-25 | End: 2024-04-25 | Stop reason: HOSPADM

## 2024-04-25 RX ORDER — HYDROMORPHONE HYDROCHLORIDE 2 MG/ML
0.2 INJECTION, SOLUTION INTRAMUSCULAR; INTRAVENOUS; SUBCUTANEOUS EVERY 5 MIN PRN
Status: DISCONTINUED | OUTPATIENT
Start: 2024-04-25 | End: 2024-04-25 | Stop reason: HOSPADM

## 2024-04-25 RX ORDER — FENTANYL CITRATE 50 UG/ML
INJECTION, SOLUTION INTRAMUSCULAR; INTRAVENOUS
Status: DISCONTINUED | OUTPATIENT
Start: 2024-04-25 | End: 2024-04-25

## 2024-04-25 RX ORDER — LIDOCAINE HYDROCHLORIDE 10 MG/ML
1 INJECTION, SOLUTION EPIDURAL; INFILTRATION; INTRACAUDAL; PERINEURAL ONCE
Status: DISCONTINUED | OUTPATIENT
Start: 2024-04-25 | End: 2024-04-25 | Stop reason: HOSPADM

## 2024-04-25 RX ORDER — MEPERIDINE HYDROCHLORIDE 25 MG/ML
50 INJECTION INTRAMUSCULAR; INTRAVENOUS; SUBCUTANEOUS
Status: DISCONTINUED | OUTPATIENT
Start: 2024-04-25 | End: 2024-04-25 | Stop reason: HOSPADM

## 2024-04-25 RX ORDER — ONDANSETRON HYDROCHLORIDE 2 MG/ML
INJECTION, SOLUTION INTRAMUSCULAR; INTRAVENOUS
Status: DISCONTINUED | OUTPATIENT
Start: 2024-04-25 | End: 2024-04-25

## 2024-04-25 RX ORDER — PROPOFOL 10 MG/ML
VIAL (ML) INTRAVENOUS
Status: DISCONTINUED | OUTPATIENT
Start: 2024-04-25 | End: 2024-04-25

## 2024-04-25 RX ORDER — HYDROMORPHONE HYDROCHLORIDE 2 MG/ML
0.4 INJECTION, SOLUTION INTRAMUSCULAR; INTRAVENOUS; SUBCUTANEOUS EVERY 5 MIN PRN
Status: DISCONTINUED | OUTPATIENT
Start: 2024-04-25 | End: 2024-04-25 | Stop reason: HOSPADM

## 2024-04-25 RX ADMIN — HYDROMORPHONE HYDROCHLORIDE 0.2 MG: 2 INJECTION, SOLUTION INTRAMUSCULAR; INTRAVENOUS; SUBCUTANEOUS at 12:04

## 2024-04-25 RX ADMIN — SODIUM CHLORIDE, POTASSIUM CHLORIDE, SODIUM LACTATE AND CALCIUM CHLORIDE: 600; 310; 30; 20 INJECTION, SOLUTION INTRAVENOUS at 11:04

## 2024-04-25 RX ADMIN — SCOPOLAMINE 1 PATCH: 1 PATCH TRANSDERMAL at 10:04

## 2024-04-25 RX ADMIN — ROCURONIUM BROMIDE 50 MG: 50 INJECTION INTRAVENOUS at 11:04

## 2024-04-25 RX ADMIN — FENTANYL CITRATE 100 MCG: 50 INJECTION, SOLUTION INTRAMUSCULAR; INTRAVENOUS at 11:04

## 2024-04-25 RX ADMIN — ACETAMINOPHEN 1000 MG: 10 INJECTION, SOLUTION INTRAVENOUS at 10:04

## 2024-04-25 RX ADMIN — SODIUM CHLORIDE, POTASSIUM CHLORIDE, SODIUM LACTATE AND CALCIUM CHLORIDE: 600; 310; 30; 20 INJECTION, SOLUTION INTRAVENOUS at 12:04

## 2024-04-25 RX ADMIN — EPHEDRINE SULFATE 20 MG: 50 INJECTION INTRAVENOUS at 11:04

## 2024-04-25 RX ADMIN — PROPOFOL 150 MG: 10 INJECTION, EMULSION INTRAVENOUS at 11:04

## 2024-04-25 RX ADMIN — LIDOCAINE HYDROCHLORIDE 20 MG: 10 INJECTION, SOLUTION EPIDURAL; INFILTRATION; INTRACAUDAL; PERINEURAL at 11:04

## 2024-04-25 RX ADMIN — SUGAMMADEX 150 MG: 100 INJECTION, SOLUTION INTRAVENOUS at 12:04

## 2024-04-25 RX ADMIN — SODIUM CHLORIDE, POTASSIUM CHLORIDE, SODIUM LACTATE AND CALCIUM CHLORIDE: 600; 310; 30; 20 INJECTION, SOLUTION INTRAVENOUS at 10:04

## 2024-04-25 RX ADMIN — ENOXAPARIN SODIUM 40 MG: 40 INJECTION SUBCUTANEOUS at 10:04

## 2024-04-25 RX ADMIN — HYDROMORPHONE HYDROCHLORIDE 0.4 MG: 2 INJECTION, SOLUTION INTRAMUSCULAR; INTRAVENOUS; SUBCUTANEOUS at 12:04

## 2024-04-25 RX ADMIN — METHOCARBAMOL 1000 MG: 100 INJECTION INTRAMUSCULAR; INTRAVENOUS at 12:04

## 2024-04-25 RX ADMIN — EPHEDRINE SULFATE 10 MG: 50 INJECTION INTRAVENOUS at 11:04

## 2024-04-25 RX ADMIN — CEFAZOLIN 2 G: 330 INJECTION, POWDER, FOR SOLUTION INTRAMUSCULAR; INTRAVENOUS at 11:04

## 2024-04-25 RX ADMIN — ONDANSETRON 4 MG: 2 INJECTION INTRAMUSCULAR; INTRAVENOUS at 11:04

## 2024-04-25 RX ADMIN — MIDAZOLAM HYDROCHLORIDE 1 MG: 2 INJECTION, SOLUTION INTRAMUSCULAR; INTRAVENOUS at 11:04

## 2024-04-25 RX ADMIN — MIDAZOLAM HYDROCHLORIDE 1 MG: 1 INJECTION, SOLUTION INTRAMUSCULAR; INTRAVENOUS at 11:04

## 2024-04-25 RX ADMIN — PROPOFOL 100 MG: 10 INJECTION, EMULSION INTRAVENOUS at 11:04

## 2024-04-25 RX ADMIN — CELECOXIB 200 MG: 200 CAPSULE ORAL at 10:04

## 2024-04-25 NOTE — ANESTHESIA PROCEDURE NOTES
Intubation    Date/Time: 4/25/2024 11:14 AM    Performed by: Wilmer Cantu CRNA  Authorized by: Clinton aCo Jr., MD    Intubation:     Induction:  Intravenous    Intubated:  Postinduction    Mask Ventilation:  Easy mask    Attempts:  1    Attempted By:  CRNA    Method of Intubation:  Direct    Blade:  Aundrea 3    Laryngeal View Grade: Grade I - full view of cords      Difficult Airway Encountered?: No      Complications:  None    Airway Device:  Oral endotracheal tube    Airway Device Size:  7.5    Style/Cuff Inflation:  Cuffed (inflated to minimal occlusive pressure)    Tube secured:  23    Secured at:  The lips    Placement Verified By:  Capnometry    Complicating Factors:  Anterior larynx    Findings Post-Intubation:  BS equal bilateral and atraumatic/condition of teeth unchanged

## 2024-04-25 NOTE — ANESTHESIA POSTPROCEDURE EVALUATION
Anesthesia Post Evaluation    Patient: Augustine Sarah    Procedure(s) Performed: Procedure(s) (LRB):  REPAIR, HERNIA, INGUINAL open  Left (Left)    Final Anesthesia Type: general      Patient location during evaluation: floor  Patient participation: Yes- Able to Participate  Level of consciousness: awake and alert  Post-procedure vital signs: reviewed and stable  Pain management: adequate  Airway patency: patent    PONV status at discharge: No PONV  Anesthetic complications: no      Cardiovascular status: blood pressure returned to baseline  Respiratory status: spontaneous ventilation and room air  Hydration status: euvolemic  Follow-up not needed.              Vitals Value Taken Time   /71 04/25/24 1301   Temp 36.3 °C (97.3 °F) 04/25/24 1217   Pulse 72 04/25/24 1304   Resp 11 04/25/24 1303   SpO2 98 % 04/25/24 1304   Vitals shown include unfiled device data.      Event Time   Out of Recovery 13:08:00         Pain/Maryjane Score: Pain Rating Prior to Med Admin: 6 (4/25/2024 12:36 PM)  Maryjane Score: 10 (4/25/2024  1:10 PM)

## 2024-04-25 NOTE — PROGRESS NOTES
Op site dressing noted, surrounding area soft and warm to the touch, color WDL.  Ice pack placed on site.

## 2024-04-25 NOTE — ANESTHESIA PREPROCEDURE EVALUATION
"                                                                                                             04/25/2024  Augustine Sarah is a 68 y.o., male with PONV presents as an outpatient for left open inguinal hernia repair.  Patient tolerated general anesthesia for similar procedure 3 months prior (see below).  Patient is concerned that he received too much morphine and Dilaudid with a previous surgery which caused over-sedation and nausea      Last 3 sets of Vitals        3/2/2024     3:16 PM 4/16/2024     2:43 PM 4/25/2024    10:10 AM   Vitals - 1 value per visit   SYSTOLIC   171   DIASTOLIC   90   Pulse   97   Temp   36.7 °C (98 °F)   Resp   20   SPO2   98 %   Weight (lb) 203 206    Weight (kg) 92.08 93.441    Height 5' 9" (1.753 m) 5' 9" (1.753 m)    BMI (Calculated) 30 30.4          Lab Results   Component Value Date    WBC 6.40 01/08/2024    HGB 14.8 01/08/2024    HCT 43.8 01/08/2024    MCV 86.6 01/08/2024     01/08/2024          BMP  Lab Results   Component Value Date     01/08/2024    K 4.9 01/08/2024    CO2 29 01/08/2024    BUN 22.0 (H) 01/08/2024    CREATININE 1.26 01/08/2024    CALCIUM 10.0 01/08/2024    EGFRNONAA >60 02/11/2022    Pre-op Assessment    I have reviewed the Patient Summary Reports.    I have reviewed the NPO Status.   I have reviewed the Medications.     Review of Systems  Anesthesia Hx:              Personal Hx of Anesthesia complications, Post-Operative Nausea/Vomiting                    Social:  Non-Smoker       Cardiovascular:     Hypertension                Functional Capacity good / => 4 METS                         Endocrine:        Obesity / BMI > 30      Physical Exam  General: Well nourished, Cooperative, Alert and Oriented    Airway:  Mallampati: II   Mouth Opening: Normal  TM Distance: Normal  Tongue: Normal  Neck ROM: Normal ROM    Dental:  Intact    Chest/Lungs:  Clear to auscultation, Normal Respiratory Rate    Heart:  Rate: Normal  Rhythm: Regular " Rhythm        Anesthesia Plan  Type of Anesthesia, risks & benefits discussed:    Anesthesia Type: Gen ETT  Intra-op Monitoring Plan: Standard ASA Monitors  Post Op Pain Control Plan: multimodal analgesia and IV/PO Opioids PRN  Induction:  IV  Airway Plan: Direct  Informed Consent: Informed consent signed with the Patient and all parties understand the risks and agree with anesthesia plan.  All questions answered.   ASA Score: 2  Day of Surgery Review of History & Physical: H&P Update referred to the surgeon/provider.    Ready For Surgery From Anesthesia Perspective.     .

## 2024-04-25 NOTE — DISCHARGE SUMMARY
[facility] Surgical - Periop Services  Discharge Note  Short Stay    Procedure(s) (LRB):  REPAIR, HERNIA, INGUINAL open  Left (Left)      OUTCOME: Patient tolerated treatment/procedure well without complication and is now ready for discharge.    DISPOSITION: Home or Self Care    FINAL DIAGNOSIS:  Left inguinal hernia    FOLLOWUP: In clinic    DISCHARGE INSTRUCTIONS:  No discharge procedures on file.     TIME SPENT ON DISCHARGE:      minutes

## 2024-04-25 NOTE — H&P
"HISTORY & PHYSICAL  General Surgery    Patient Name: Augustine Sarah  YOB: 1955    Date: 04/25/2024                     PRESENTING HISTORY     Chief Complaint/Reason for Admission: "recurrent left inguinal hernia"    History of Present Illness:  Mr. Augustine Sarah is a 68 y.o. male who underwent a Laparoscopic Left Inguinal Hernia but early postoperatively was opening a window with straining and felt a discomfort in his Left groin and then reports he started noticing a bulge in his left groin consistent with recurrence.  Denies any changes to bowel / bladder habits. He denies CP/SOB or fever/chills.     Review of Systems:  12 point ROS negative except as stated in HPI    PAST HISTORY:     Past Medical History:   Diagnosis Date    Hypertension     Left inguinal hernia     PONV (postoperative nausea and vomiting)     pt states this was due to excessive amount of Morphine post op after previous sx at another facility.    Thyroid disease        Past Surgical History:   Procedure Laterality Date    COLONOSCOPY  03/09/2020    INGUINAL HERNIA REPAIR Left     INGUINAL HERNIA REPAIR Right     LIPOMA RESECTION      upper back/ neck area    REPAIR, HERNIA, INGUINAL, LAPAROSCOPIC Left 01/11/2024    Procedure: REPAIR, HERNIA, INGUINAL, LAPAROSCOPIC  Left;  Surgeon: Sanjay Marsh Jr., MD;  Location: [facility];  Service: General;  Laterality: Left;  lap left inguinal hernia repair    right knee arthroscopy      THYROIDECTOMY  2018       Family History   Problem Relation Name Age of Onset    Hypertension Mother      Hypertension Father         Social History     Socioeconomic History    Marital status:    Tobacco Use    Smoking status: Some Days     Types: Cigars    Smokeless tobacco: Never   Substance and Sexual Activity    Alcohol use: Yes     Alcohol/week: 1.0 - 2.0 standard drink of alcohol     Types: 1 - 2 Drinks containing 0.5 oz of alcohol per week     Comment: weekly    Drug use: Not Currently    Sexual " activity: Not Currently       MEDICATIONS & ALLERGIES:     Current Facility-Administered Medications   Medication Dose Route Frequency Provider Last Rate Last Admin    acetaminophen 1,000 mg/100 mL (10 mg/mL) injection 1,000 mg  1,000 mg Intravenous Once Sunflower, Clinton REEVES Jr., MD        ceFAZolin injection 2 g  2 g Intravenous On Call Procedure Sanjay Marsh Jr., MD        celecoxib capsule 200 mg  200 mg Oral Once SunflowerClinton Jr., MD        enoxaparin injection 40 mg  40 mg Subcutaneous On Call Procedure Sanjay Marsh Jr., MD        lactated ringers infusion   Intravenous Continuous Jazmín Palomo FNP        midazolam (VERSED) 1 mg/mL injection              Facility-Administered Medications Ordered in Other Encounters   Medication Dose Route Frequency Provider Last Rate Last Admin    lactated ringers infusion   Intravenous Continuous Sanjay Marsh Jr., MD 75 mL/hr at 01/11/24 0918 New Bag at 01/11/24 1114       Review of patient's allergies indicates:  No Known Allergies    OBJECTIVE:     Vital Signs:  Temp:  [98 °F (36.7 °C)] 98 °F (36.7 °C)  Pulse:  [97] 97  Resp:  [20] 20  SpO2:  [98 %] 98 %  BP: (171)/(90) 171/90  Body mass index is 30.42 kg/m².     Physical Exam:  General:  Well developed, well nourished, no acute distress  HEENT:  Normocephalic, atraumatic, PERRL, EOMI, clear sclera, ears normal, neck supple, throat clear without erythema or exudates  CVS:  RRR, S1 and S2 normal, no murmurs, rubs, gallops  Resp:  Lungs clear to auscultation, no wheezes, rales, rhonchi, cough  GI:  Abdomen soft, non-tender, non-distended, normoactive bowel sounds, no masses  :  R groin - no hernia appreciated, nttp, descended testicle  L groin - Reducible Left Inguinal Hernia, nttp, descended testicle  MSK:  No muscle atrophy, cyanosis, peripheral edema, full range of motion  Skin:  No rashes, ulcers, erythema  Neuro:  CNII-XII grossly intact  Psych:  Alert and oriented to person, place, and  time          ASSESSMENT & PLAN:   Mr. Augustine Sarah is a 68 y.o. male with recurrent left inguinal hernia    Plan:  Risks / Benefits of Recurrent hernia repair including potential nerve injury was discussed with the patient who voiced understanding and wishes to continue.     - Open Left Inguinal Hernia Repair  - Preoperative workup as ordered         Jazmín Palomo  General Surgery    4/25/2024  10:21 AM

## 2024-04-25 NOTE — TRANSFER OF CARE
"Anesthesia Transfer of Care Note    Patient: Augustine Sarah    Procedure(s) Performed: Procedure(s) (LRB):  REPAIR, HERNIA, INGUINAL open  Left (Left)    Patient location: PACU    Anesthesia Type: general    Transport from OR: Transported from OR on room air with adequate spontaneous ventilation    Post pain: adequate analgesia    Post assessment: no apparent anesthetic complications    Post vital signs: stable    Level of consciousness: responds to stimulation    Nausea/Vomiting: no nausea/vomiting    Complications: none    Transfer of care protocol was followed      Last vitals: Visit Vitals  /63   Pulse 78   Temp 36.3 °C (97.3 °F)   Resp 15   Ht 5' 9" (1.753 m)   Wt 94.9 kg (209 lb 3.5 oz)   SpO2 98%   BMI 30.90 kg/m²     "

## 2024-04-25 NOTE — PLAN OF CARE
Vitals signs stable. Pain and nausea well controlled. Discharge criteria met.  Discharged via wheelchair to private auto accompanied by spouse.    Problem: Hypertension Acute  Goal: Blood Pressure Within Desired Range  Outcome: Met     Problem: Pain Acute  Goal: Optimal Pain Control and Function  Outcome: Met     Problem: Fall Injury Risk  Goal: Absence of Fall and Fall-Related Injury  Outcome: Met     Problem: Infection  Goal: Absence of Infection Signs and Symptoms  Outcome: Met     Problem: Adult Inpatient Plan of Care  Goal: Plan of Care Review  Outcome: Met  Goal: Patient-Specific Goal (Individualized)  Outcome: Met  Goal: Absence of Hospital-Acquired Illness or Injury  Outcome: Met  Goal: Optimal Comfort and Wellbeing  Outcome: Met  Goal: Readiness for Transition of Care  Outcome: Met     Problem: Wound  Goal: Optimal Coping  Outcome: Met  Goal: Optimal Functional Ability  Outcome: Met  Goal: Absence of Infection Signs and Symptoms  Outcome: Met  Goal: Improved Oral Intake  Outcome: Met  Goal: Optimal Pain Control and Function  Outcome: Met  Goal: Skin Health and Integrity  Outcome: Met  Goal: Optimal Wound Healing  Outcome: Met

## 2024-04-27 VITALS
BODY MASS INDEX: 30.98 KG/M2 | DIASTOLIC BLOOD PRESSURE: 73 MMHG | WEIGHT: 209.19 LBS | OXYGEN SATURATION: 100 % | SYSTOLIC BLOOD PRESSURE: 139 MMHG | TEMPERATURE: 97 F | HEIGHT: 69 IN | RESPIRATION RATE: 16 BRPM | HEART RATE: 65 BPM

## 2024-04-29 LAB — PSYCHE PATHOLOGY RESULT: NORMAL

## 2024-05-05 NOTE — OP NOTE
PATIENT:  Augustine Sarah      : 1955       DATE OF SURGERY:   2024          SURGEON:  Sanjay Marsh MD       ASSISTANT:  Jazmín Dominguez NP (First Assistant)          PREOPERATIVE DIAGNOSIS:  Recurrent Left Inguinal Hernia           POST OPERATIVE DIAGNOSIS:  Same.           PROCEDURE:  Open Direct Recurrent Left Inguinal Hernia Repair with mesh          ANESTHESIA:  General endotracheal anesthesia.           ESTIMATED BLOOD LOSS:  Approximately 20 cc.   Blood administered, none.           LAP AND INSTRUMENT COUNT:  Correct X2 at the end of the case.           SPECIMENS:  Cord Lipoma          INDICATION AND SIGNIFICANT HISTORY:  Patient is a 68 y.o.-year-old male complaining of buldge in his Left groin for the past few weeks.  Patient was worked up clincally and found to have a Recurrent left inguinal hernia after lifting a window post previous surgery.  Risks and benefits of surgery was discussed with the patient who voiced understanding of risks / benefits and elected to proceed with surgery.                   PROCEDURE IN DETAIL:  Once informed consents were obtained, the patient was taken to the OR and placed supine on the OR table.  After general endotracheal anesthesia was induced the abdomen was prepped and draped in the standard surgical fashion.  An incision was made in line with the inguinal ligament in the left lower quadrant.  Dissection was carried out through Sharath's fascia to the level of the external oblique aponeurosis which appeared to have some scar tissue due to previous operative site.  This was then opened in the line of its fibers sharply and retracted laterally.  The cord and cord structures were dissected off the pelvic floor and wrapped with a Penrose drain for retraction.  The ilioinguinal nerve was dissected off the cord and retracted out of the operative area.  Attention was then redirected to the anterior medial portion of the sac which was dissected and a direct hernia  sac was found.  The patient had a large direct inguinal hernia sac with chronic scarring to all surrounding tissue.  Care was taken to slowly and easily dissect all the surrounding structures including the vas deferens off the hernia sac from the cord.  The hernia sac was easily reducible.  A  cord lipoma was removed and passed off the table as a specimen.   The wound was then copiously irrigated and dried.  The patient also had a moderate degree of laxity in the pelvic floor, a prolene hernia system mesh was placed, secured to the pubic tubercle and inguinal ligament laterally, and shelving border medially.  External oblique aponeurosis was closed with a 2-0 Vicryl suture in a running fashion.  Sharath's fascia was closed with 3-0 Vicryl suture in a running subcuticular fashion.  The skin was then closed with a 4-0 Vicryl suture in a subcuticular fashion.  The skin was clean and dry and a dry sterile dressing was placed on the wound.  Lap and instrument counts were correct times two at the end of the case.Jazmín Dominguez was present during the entirety of the case and was critical in retraction for proper dissection.  The patient tolerated the procedure well and was transported to the Recovery Room in good condition.

## 2024-05-08 ENCOUNTER — OFFICE VISIT (OUTPATIENT)
Dept: SURGERY | Facility: CLINIC | Age: 69
End: 2024-05-08
Payer: COMMERCIAL

## 2024-05-08 VITALS
WEIGHT: 207.25 LBS | HEART RATE: 73 BPM | BODY MASS INDEX: 30.7 KG/M2 | DIASTOLIC BLOOD PRESSURE: 89 MMHG | SYSTOLIC BLOOD PRESSURE: 134 MMHG | HEIGHT: 69 IN

## 2024-05-08 DIAGNOSIS — Z98.890 POST-OPERATIVE STATE: Primary | ICD-10-CM

## 2024-05-08 PROCEDURE — 99024 POSTOP FOLLOW-UP VISIT: CPT | Mod: ,,,

## 2024-05-08 PROCEDURE — 1160F RVW MEDS BY RX/DR IN RCRD: CPT | Mod: CPTII,,,

## 2024-05-08 PROCEDURE — 1159F MED LIST DOCD IN RCRD: CPT | Mod: CPTII,,,

## 2024-05-08 PROCEDURE — 3075F SYST BP GE 130 - 139MM HG: CPT | Mod: CPTII,,,

## 2024-05-08 PROCEDURE — 4010F ACE/ARB THERAPY RXD/TAKEN: CPT | Mod: CPTII,,,

## 2024-05-08 PROCEDURE — 1126F AMNT PAIN NOTED NONE PRSNT: CPT | Mod: CPTII,,,

## 2024-05-08 PROCEDURE — 3079F DIAST BP 80-89 MM HG: CPT | Mod: CPTII,,,

## 2024-05-08 NOTE — PROGRESS NOTES
Post Operative Progress Note  General Surgery    Patient Name: Augustine Sarah  YOB: 1955    Date: 05/08/2024                   SUBJECTIVE:     Chief Complaint/Reason for Admission:   Chief Complaint   Patient presents with    Post-op Evaluation     Open left inguinal hernia repair 4/25/24        History of Present Illness:  Mr. Augustine Sarah is a 68 y.o. male s/p open left inguinal hernia repair with mesh. male without any complaints. Tolerating a regular diet without changes to bowel / bladder habits. Denies fever / chills.    Review of Systems:  12 point ROS negative except as stated in HPI    PAST HISTORY:     Past Medical History:   Diagnosis Date    Hypertension     Left inguinal hernia     PONV (postoperative nausea and vomiting)     pt states this was due to excessive amount of Morphine post op after previous sx at another facility.    Thyroid disease      Past Surgical History:   Procedure Laterality Date    COLONOSCOPY  03/09/2020    INGUINAL HERNIA REPAIR Left     INGUINAL HERNIA REPAIR Right     LIPOMA RESECTION      upper back/ neck area    REPAIR, HERNIA, INGUINAL Left 4/25/2024    Procedure: REPAIR, HERNIA, INGUINAL open  Left;  Surgeon: Sanjay Marsh Jr., MD;  Location: [facility] OR;  Service: General;  Laterality: Left;    REPAIR, HERNIA, INGUINAL, LAPAROSCOPIC Left 01/11/2024    Procedure: REPAIR, HERNIA, INGUINAL, LAPAROSCOPIC  Left;  Surgeon: Sanjay Marsh Jr., MD;  Location: [facility] OR;  Service: General;  Laterality: Left;  lap left inguinal hernia repair    right knee arthroscopy      THYROIDECTOMY  2018     Family History   Problem Relation Name Age of Onset    Hypertension Mother      Hypertension Father       Social History     Socioeconomic History    Marital status:    Tobacco Use    Smoking status: Some Days     Types: Cigars    Smokeless tobacco: Never   Substance and Sexual Activity    Alcohol use: Yes     Alcohol/week: 1.0 - 2.0 standard drink of alcohol     Types:  "1 - 2 Drinks containing 0.5 oz of alcohol per week     Comment: weekly    Drug use: Not Currently    Sexual activity: Not Currently       MEDICATIONS & ALLERGIES:     Current Outpatient Medications on File Prior to Visit   Medication Sig    co-enzyme Q-10 30 mg capsule Take 30 mg by mouth once daily.    HYDROcodone-acetaminophen (NORCO) 7.5-325 mg per tablet Take 1 tablet by mouth every 6 (six) hours as needed for Pain.    levothyroxine (SYNTHROID) 150 MCG tablet Take 150 mcg by mouth before breakfast.    losartan (COZAAR) 50 MG tablet Take 1 tablet (50 mg total) by mouth once daily.     Current Facility-Administered Medications on File Prior to Visit   Medication    enoxaparin injection 40 mg    lactated ringers infusion    lactated ringers infusion     Review of patient's allergies indicates:  No Known Allergies    OBJECTIVE:     Vitals:    05/08/24 1503   BP: 134/89   Pulse: 73   Weight: 94 kg (207 lb 3.7 oz)   Height: 5' 9" (1.753 m)     Body mass index is 30.6 kg/m².    Physical Exam:  General:  Well developed, well nourished, no acute distress  HEENT:  Normocephalic, atraumatic, PERRL, EOMI, clear sclera, ears normal, neck supple, throat clear without erythema or exudates  CVS:  RRR, S1 and S2 normal, no murmurs, rubs, gallops  Resp:  Lungs clear to auscultation, no wheezes, rales, rhonchi, cough  GI:  Abdomen soft, non-tender, non-distended, normoactive bowel sounds, no masses  :  Deferred  MSK:  No muscle atrophy, cyanosis, peripheral edema, full range of motion  Skin:  No rashes, ulcers, erythema. Incision c/d/i  Neuro:  CNII-XII grossly intact  Psych:  Alert and oriented to person, place, and time        VISIT DIAGNOSES:       ICD-10-CM ICD-9-CM   1. Post-operative state  Z98.890 V45.89       ASSESSMENT/PLAN:   Mr. Augustine Sarah is a 68 y.o. male s/p open left inguinal hernia repair    - doing well  - no heavy lifting >20lbs x 2 additional weeks    RTC PRN        "

## 2024-07-29 PROBLEM — E03.9 HYPOTHYROIDISM: Status: ACTIVE | Noted: 2024-07-29

## 2024-07-29 PROBLEM — R35.1 NOCTURIA: Status: ACTIVE | Noted: 2024-07-29

## 2024-07-29 PROBLEM — E78.00 ELEVATED LDL CHOLESTEROL LEVEL: Status: ACTIVE | Noted: 2024-07-29

## 2024-12-11 ENCOUNTER — TELEPHONE (OUTPATIENT)
Dept: PHARMACY | Facility: CLINIC | Age: 69
End: 2024-12-11
Payer: COMMERCIAL

## 2024-12-12 NOTE — TELEPHONE ENCOUNTER
[facility]/Population Health Chart Review & Patient Outreach Details For Medication Adherence Project    Reason for Outreach Encounter: 3rd Party payor non-compliance report ([employer], [employer], [facility], etc)  2.  Patient Outreach Method: Beebrite[employer] message  3.   Medication in question: losartan    LAST FILLED: 11/4/24 for 30 day supply  Hypertension Medications               losartan (COZAAR) 50 MG tablet Take 1 tablet (50 mg total) by mouth once daily.              4.  Reviewed and or Updates Made To: Patient Chart  5. Outreach Outcomes and/or actions taken: Sent inquiry to patient: Waiting for response.

## 2025-04-23 ENCOUNTER — TELEPHONE (OUTPATIENT)
Dept: PHARMACY | Facility: CLINIC | Age: 70
End: 2025-04-23
Payer: COMMERCIAL

## 2025-04-23 NOTE — TELEPHONE ENCOUNTER
[facility]/Population Health Chart Review & Patient Outreach Details For Medication Adherence Project    Reason for Outreach Encounter: 3rd Party payor non-compliance report ([employer], [employer], [employer], etc)  2.  Patient Outreach Method: Reviewed patient chart  and Plex[employer] message  3.   Medication in question:    Hypertension Medications              losartan (COZAAR) 50 MG tablet Take 1 tablet (50 mg total) by mouth once daily.                   4.  Reviewed and or Updates Made To: Patient Chart  5. Outreach Outcomes and/or actions taken: Other  Additional Notes:

## (undated) DEVICE — GLOVE SIGNATURE MICRO LTX 7.5

## (undated) DEVICE — CANNULA ENDOPATH XCEL 5X100MM

## (undated) DEVICE — SOL IRRI STRL WATER 1000ML

## (undated) DEVICE — SPONGE LAP 18X18 PREWASHED

## (undated) DEVICE — DRESSING TRANS 4X4 TEGADERM

## (undated) DEVICE — GLOVE SENSICARE PI GRN 6.5

## (undated) DEVICE — SUT VICRYL PLUS 4-0 FS-2 27IN

## (undated) DEVICE — STRIP MEDI WND CLSR 1/2X4IN

## (undated) DEVICE — SUT 2/0 30IN PROLENE MONO

## (undated) DEVICE — Device

## (undated) DEVICE — SUT VICRYL 3-0 27 SH

## (undated) DEVICE — SCISSOR CURVED ENDOPATH 5MM

## (undated) DEVICE — GOWN POLY REINF BRTH SLV XL

## (undated) DEVICE — SUPPORT ULNA NERVE PROTECTOR

## (undated) DEVICE — SOL NACL IRR 1000ML BTL

## (undated) DEVICE — GLOVE SIGNATURE MICRO LTX 6.5

## (undated) DEVICE — GAUZE SPONGE 4X4 12PLY

## (undated) DEVICE — GLOVE SENSICARE PI SURG 6.5

## (undated) DEVICE — NDL SYR 10ML 18X1.5 LL BLUNT

## (undated) DEVICE — SUT 2/0 30IN SILK BLK BRAI

## (undated) DEVICE — ADHESIVE MASTISOL VIAL 48/BX

## (undated) DEVICE — TROCAR ENDOPATH XCEL 5X100MM

## (undated) DEVICE — ELECTRODE PATIENT RETURN DISP

## (undated) DEVICE — GLOVE SENSICARE PI SURG 6

## (undated) DEVICE — DRAIN JP STD PENROSE 1/4X12IN

## (undated) DEVICE — TROCAR SPACEMAKER BLUNT 10MM

## (undated) DEVICE — STAPLER INT HERNIA PROTACK 5MM

## (undated) DEVICE — TRAY SKIN SCRUB WET PREMIUM

## (undated) DEVICE — SUT VICRYL+ 27 UR-6 VIOL

## (undated) DEVICE — NDL 27G X 1 1/4

## (undated) DEVICE — SUT CTD VICRYL 2.0